# Patient Record
Sex: FEMALE | Race: OTHER | NOT HISPANIC OR LATINO
[De-identification: names, ages, dates, MRNs, and addresses within clinical notes are randomized per-mention and may not be internally consistent; named-entity substitution may affect disease eponyms.]

---

## 2022-09-22 PROBLEM — Z00.00 ENCOUNTER FOR PREVENTIVE HEALTH EXAMINATION: Status: ACTIVE | Noted: 2022-09-22

## 2022-10-11 ENCOUNTER — APPOINTMENT (OUTPATIENT)
Dept: MRI IMAGING | Facility: CLINIC | Age: 32
End: 2022-10-11

## 2022-10-11 ENCOUNTER — OUTPATIENT (OUTPATIENT)
Dept: OUTPATIENT SERVICES | Facility: HOSPITAL | Age: 32
LOS: 1 days | End: 2022-10-11

## 2022-10-11 DIAGNOSIS — Z00.8 ENCOUNTER FOR OTHER GENERAL EXAMINATION: ICD-10-CM

## 2022-10-14 ENCOUNTER — OUTPATIENT (OUTPATIENT)
Dept: OUTPATIENT SERVICES | Facility: HOSPITAL | Age: 32
LOS: 1 days | End: 2022-10-14
Payer: MEDICARE

## 2022-10-14 ENCOUNTER — APPOINTMENT (OUTPATIENT)
Dept: MRI IMAGING | Facility: HOSPITAL | Age: 32
End: 2022-10-14

## 2022-10-14 PROCEDURE — 70553 MRI BRAIN STEM W/O & W/DYE: CPT

## 2022-10-14 PROCEDURE — 70553 MRI BRAIN STEM W/O & W/DYE: CPT | Mod: 26,MH

## 2022-10-14 PROCEDURE — A9585: CPT

## 2022-10-21 ENCOUNTER — NON-APPOINTMENT (OUTPATIENT)
Age: 32
End: 2022-10-21

## 2022-10-21 ENCOUNTER — APPOINTMENT (OUTPATIENT)
Dept: ENDOCRINOLOGY | Facility: CLINIC | Age: 32
End: 2022-10-21

## 2022-10-21 ENCOUNTER — APPOINTMENT (OUTPATIENT)
Dept: OPHTHALMOLOGY | Facility: CLINIC | Age: 32
End: 2022-10-21

## 2022-10-21 VITALS
BODY MASS INDEX: 42.06 KG/M2 | SYSTOLIC BLOOD PRESSURE: 135 MMHG | HEART RATE: 72 BPM | DIASTOLIC BLOOD PRESSURE: 85 MMHG | WEIGHT: 268 LBS | HEIGHT: 67 IN

## 2022-10-21 PROCEDURE — 92004 COMPRE OPH EXAM NEW PT 1/>: CPT

## 2022-10-21 PROCEDURE — 92133 CPTRZD OPH DX IMG PST SGM ON: CPT

## 2022-10-21 PROCEDURE — 99205 OFFICE O/P NEW HI 60 MIN: CPT | Mod: GC

## 2022-10-21 PROCEDURE — 92083 EXTENDED VISUAL FIELD XM: CPT

## 2022-10-25 NOTE — ASSESSMENT
[FreeTextEntry1] : 32 year old with known pituitary macroadenoma\par \par \par \par Pituitary macroadenoma-\par 1cm on MRI with mass effect \par no longer with hyperprolactin symptoms however last level in 300s done in outside lab\par has appt with optho today \par Neurosurgery eval in 1 week- will likely plan surgical intervention (Brad)\par explained to patient risk of possible hormonal abonormalities and need for replacement therapy post operatively. \par Suspicion of non-functioning tumor, hyperprolactinemia likely from mass effect. \par However at this time will do full hormonal workup including but not limited to TSH, IGF-1, prolactin, LH, FSH, DHEAS\par f/u pituitary hormone and adrenal labs \par will collect 24 hour urine for catecholamines\par f/u salivary cortisol \par wrote out for patient order in which to do the workup \par \par will call with results \par plan for close follow up \par \par discussed case with Dr. Barnes

## 2022-10-25 NOTE — PHYSICAL EXAM
[Alert] : alert [Well Nourished] : well nourished [Obese] : obese [No Acute Distress] : no acute distress [Well Developed] : well developed [Normal Sclera/Conjunctiva] : normal sclera/conjunctiva [EOMI] : extra ocular movement intact [No Proptosis] : no proptosis [Normal Oropharynx] : the oropharynx was normal [Thyroid Not Enlarged] : the thyroid was not enlarged [No Thyroid Nodules] : no palpable thyroid nodules [No Respiratory Distress] : no respiratory distress [No Accessory Muscle Use] : no accessory muscle use [Clear to Auscultation] : lungs were clear to auscultation bilaterally [Normal S1, S2] : normal S1 and S2 [Normal Rate] : heart rate was normal [Regular Rhythm] : with a regular rhythm [No Edema] : no peripheral edema [Pedal Pulses Normal] : the pedal pulses are present [Normal Bowel Sounds] : normal bowel sounds [Not Tender] : non-tender [Not Distended] : not distended [Soft] : abdomen soft [Normal Anterior Cervical Nodes] : no anterior cervical lymphadenopathy [Normal Posterior Cervical Nodes] : no posterior cervical lymphadenopathy [No Spinal Tenderness] : no spinal tenderness [Spine Straight] : spine straight [No Stigmata of Cushings Syndrome] : no stigmata of Cushings Syndrome [Normal Gait] : normal gait [Normal Strength/Tone] : muscle strength and tone were normal [No Rash] : no rash [Normal Reflexes] : deep tendon reflexes were 2+ and symmetric [No Tremors] : no tremors [Oriented x3] : oriented to person, place, and time [Acanthosis Nigricans] : no acanthosis nigricans [de-identified] : large gums, poor dentition  [de-identified] : some darkening of the skin noted over interphalangeal joints.

## 2022-10-25 NOTE — HISTORY OF PRESENT ILLNESS
[FreeTextEntry1] : 32 year old with pituitary macroadenoma \par \par Diagnosed with hyperprolactinemia in 2011 when she was having symptoms of galactorrhea and amenorrhea. \par She went for MRI showing pituitary mass, was started on cabergoline with improvement of symptoms. \par She reports no side effects on cabergoline and symptoms improved. She stopped cabergoline in 2016 and her periods remained normal, presenting monthly and galactorrhea did not present again. \par She went for repeat MRI 10/17/22: Findings were compatible with hemorrhagic left pituitary macroadenoma with \par chiasmatic compression and possible extension into the posterior left cavernous sinus.\par \par Prior records are not available for comparison however patient states the mass has never shrunk despite treatment. \par She has an appointment with opthalmologist today and with a neurosurgeon in one week. The plan is for surgical intervention. \par \par She denies additional physical changes such as darkening of the skin, changes in fat distribution, abdominal striae, changes in her gums, denies changes in vision or headaches. \par \par FH: mother had breast cancer, DM\par \par SH: no drinking or smoking \par

## 2022-10-26 LAB
CORTIS SERPL-MCNC: 6 UG/DL
ESTIMATED AVERAGE GLUCOSE: 103 MG/DL
FSH SERPL-MCNC: 1.9 IU/L
GH SERPL-MCNC: 0.07 NG/ML
HBA1C MFR BLD HPLC: 5.2 %
IGF-1 INTERP: NORMAL
IGF-I BLD-MCNC: 243 NG/ML
LH SERPL-ACNC: 1 IU/L
PROLACTIN SERPL-MCNC: 379 NG/ML
TESTOST FREE SERPL-MCNC: 3.1 PG/ML
TESTOST SERPL-MCNC: 18.7 NG/DL
TSH SERPL-ACNC: 1.81 UIU/ML

## 2022-10-29 ENCOUNTER — APPOINTMENT (OUTPATIENT)
Dept: MRI IMAGING | Facility: IMAGING CENTER | Age: 32
End: 2022-10-29

## 2022-10-31 ENCOUNTER — APPOINTMENT (OUTPATIENT)
Dept: ENDOCRINOLOGY | Facility: CLINIC | Age: 32
End: 2022-10-31

## 2022-11-01 ENCOUNTER — APPOINTMENT (OUTPATIENT)
Dept: OTOLARYNGOLOGY | Facility: CLINIC | Age: 32
End: 2022-11-01

## 2022-11-01 VITALS
WEIGHT: 268 LBS | HEIGHT: 67 IN | HEART RATE: 65 BPM | SYSTOLIC BLOOD PRESSURE: 119 MMHG | BODY MASS INDEX: 42.06 KG/M2 | DIASTOLIC BLOOD PRESSURE: 81 MMHG

## 2022-11-01 DIAGNOSIS — Z82.5 FAMILY HISTORY OF ASTHMA AND OTHER CHRONIC LOWER RESPIRATORY DISEASES: ICD-10-CM

## 2022-11-01 DIAGNOSIS — Z80.3 FAMILY HISTORY OF MALIGNANT NEOPLASM OF BREAST: ICD-10-CM

## 2022-11-01 DIAGNOSIS — D35.2 BENIGN NEOPLASM OF PITUITARY GLAND: ICD-10-CM

## 2022-11-01 DIAGNOSIS — Z78.9 OTHER SPECIFIED HEALTH STATUS: ICD-10-CM

## 2022-11-01 DIAGNOSIS — Z83.3 FAMILY HISTORY OF DIABETES MELLITUS: ICD-10-CM

## 2022-11-01 PROCEDURE — 31231 NASAL ENDOSCOPY DX: CPT

## 2022-11-01 PROCEDURE — 99204 OFFICE O/P NEW MOD 45 MIN: CPT | Mod: 25

## 2022-11-01 NOTE — REASON FOR VISIT
[Initial Evaluation] : an initial evaluation for [Family Member] : family member [FreeTextEntry2] : pituitary tumor

## 2022-11-01 NOTE — HISTORY OF PRESENT ILLNESS
[de-identified] : 32 year old female presents for pituitary tumor \par Referred by Dr Roni Ramirez, neurosurgeon\par Has known about pituitary tumor since 2011-- initially treated with meds with some effect but endo thinks hyperprolactinemia is more likely due to mass effect\par Ophtho seen-- note reviewed-- full fields\par \par MRI head 10/14 at [facility]- reviewed personally- Impression: Findings are compatible with hemorrhagic left pituitary macroadenoma with chiasmatic compression and possible extension into the posterior left cavernous sinus\par Reports intermittent nasal congestion, headaches, takes motrin. \par Denies sinus pain/pressure, nasal discharge.

## 2022-11-07 ENCOUNTER — RESULT REVIEW (OUTPATIENT)
Age: 32
End: 2022-11-07

## 2022-11-07 ENCOUNTER — OUTPATIENT (OUTPATIENT)
Dept: OUTPATIENT SERVICES | Facility: HOSPITAL | Age: 32
LOS: 1 days | End: 2022-11-07
Payer: MEDICARE

## 2022-11-07 ENCOUNTER — APPOINTMENT (OUTPATIENT)
Dept: CT IMAGING | Facility: HOSPITAL | Age: 32
End: 2022-11-07

## 2022-11-07 PROCEDURE — 70486 CT MAXILLOFACIAL W/O DYE: CPT | Mod: MH

## 2022-11-07 PROCEDURE — 70486 CT MAXILLOFACIAL W/O DYE: CPT | Mod: 26,MH

## 2022-11-09 LAB
DOPAMINE UR-MCNC: 291 UG/L
DOPAMINE, UR, 24HR: 189 UG/24 HR
EPINEPH UR-MCNC: 4 UG/L
EPINEPHRINE, U, 24HR: 3 UG/24 HR
NOREPINEPH UR-MCNC: 54 UG/L
NOREPINEPHRINE,U,24H: 35 UG/24 HR

## 2022-11-11 LAB — DHEA-SULFATE, SERUM: 209 UG/DL

## 2022-11-21 ENCOUNTER — APPOINTMENT (OUTPATIENT)
Dept: OPHTHALMOLOGY | Facility: CLINIC | Age: 32
End: 2022-11-21

## 2022-12-16 ENCOUNTER — APPOINTMENT (OUTPATIENT)
Dept: ENDOCRINOLOGY | Facility: CLINIC | Age: 32
End: 2022-12-16

## 2023-01-26 ENCOUNTER — OUTPATIENT (OUTPATIENT)
Dept: OUTPATIENT SERVICES | Facility: HOSPITAL | Age: 33
LOS: 1 days | End: 2023-01-26
Payer: MEDICARE

## 2023-01-26 VITALS
OXYGEN SATURATION: 98 % | DIASTOLIC BLOOD PRESSURE: 82 MMHG | SYSTOLIC BLOOD PRESSURE: 128 MMHG | HEIGHT: 67.5 IN | WEIGHT: 274.03 LBS | TEMPERATURE: 99 F | RESPIRATION RATE: 16 BRPM | HEART RATE: 80 BPM

## 2023-01-26 DIAGNOSIS — D35.2 BENIGN NEOPLASM OF PITUITARY GLAND: ICD-10-CM

## 2023-01-26 DIAGNOSIS — D49.7 NEOPLASM OF UNSPECIFIED BEHAVIOR OF ENDOCRINE GLANDS AND OTHER PARTS OF NERVOUS SYSTEM: ICD-10-CM

## 2023-01-26 DIAGNOSIS — E66.01 MORBID (SEVERE) OBESITY DUE TO EXCESS CALORIES: ICD-10-CM

## 2023-01-26 LAB
ANION GAP SERPL CALC-SCNC: 10 MMOL/L — SIGNIFICANT CHANGE UP (ref 7–14)
BLD GP AB SCN SERPL QL: NEGATIVE — SIGNIFICANT CHANGE UP
BUN SERPL-MCNC: 16 MG/DL — SIGNIFICANT CHANGE UP (ref 7–23)
CALCIUM SERPL-MCNC: 9.7 MG/DL — SIGNIFICANT CHANGE UP (ref 8.4–10.5)
CHLORIDE SERPL-SCNC: 106 MMOL/L — SIGNIFICANT CHANGE UP (ref 98–107)
CO2 SERPL-SCNC: 28 MMOL/L — SIGNIFICANT CHANGE UP (ref 22–31)
CREAT SERPL-MCNC: 0.85 MG/DL — SIGNIFICANT CHANGE UP (ref 0.5–1.3)
EGFR: 94 ML/MIN/1.73M2 — SIGNIFICANT CHANGE UP
GLUCOSE SERPL-MCNC: 91 MG/DL — SIGNIFICANT CHANGE UP (ref 70–99)
HCG SERPL-ACNC: <5 MIU/ML — SIGNIFICANT CHANGE UP
HCT VFR BLD CALC: 35.5 % — SIGNIFICANT CHANGE UP (ref 34.5–45)
HGB BLD-MCNC: 11.6 G/DL — SIGNIFICANT CHANGE UP (ref 11.5–15.5)
MCHC RBC-ENTMCNC: 28.9 PG — SIGNIFICANT CHANGE UP (ref 27–34)
MCHC RBC-ENTMCNC: 32.7 GM/DL — SIGNIFICANT CHANGE UP (ref 32–36)
MCV RBC AUTO: 88.3 FL — SIGNIFICANT CHANGE UP (ref 80–100)
NRBC # BLD: 0 /100 WBCS — SIGNIFICANT CHANGE UP (ref 0–0)
NRBC # FLD: 0 K/UL — SIGNIFICANT CHANGE UP (ref 0–0)
PLATELET # BLD AUTO: 257 K/UL — SIGNIFICANT CHANGE UP (ref 150–400)
POTASSIUM SERPL-MCNC: 3.7 MMOL/L — SIGNIFICANT CHANGE UP (ref 3.5–5.3)
POTASSIUM SERPL-SCNC: 3.7 MMOL/L — SIGNIFICANT CHANGE UP (ref 3.5–5.3)
RBC # BLD: 4.02 M/UL — SIGNIFICANT CHANGE UP (ref 3.8–5.2)
RBC # FLD: 13.1 % — SIGNIFICANT CHANGE UP (ref 10.3–14.5)
RH IG SCN BLD-IMP: POSITIVE — SIGNIFICANT CHANGE UP
SODIUM SERPL-SCNC: 144 MMOL/L — SIGNIFICANT CHANGE UP (ref 135–145)
WBC # BLD: 7.73 K/UL — SIGNIFICANT CHANGE UP (ref 3.8–10.5)
WBC # FLD AUTO: 7.73 K/UL — SIGNIFICANT CHANGE UP (ref 3.8–10.5)

## 2023-01-26 PROCEDURE — 93010 ELECTROCARDIOGRAM REPORT: CPT

## 2023-01-26 RX ORDER — SODIUM CHLORIDE 9 MG/ML
1000 INJECTION, SOLUTION INTRAVENOUS
Refills: 0 | Status: DISCONTINUED | OUTPATIENT
Start: 2023-02-03 | End: 2023-02-03

## 2023-01-26 NOTE — H&P PST ADULT - HISTORY OF PRESENT ILLNESS
33 y/o female with hx hyperprolactinemia 2011 with amenorrhea. MRI was done which showed pituitary mass, started on Cabergoline with improvement  Cabergoline was stopped 2016, her periods remained normal.  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  Scheduled for Transphenoidal resection of suprasellar lesion (pituitary tumor, possible mucosal graft, possible nasoseptal flap.    Pt is a poor historian.           .

## 2023-01-26 NOTE — H&P PST ADULT - ASSESSMENT
33 y/o female with hx hyperprolactinemia 2011 with amenorrhea. MRI was done which showed pituitary mass, and was started on Cabergoline with improvement  Cabergoline was stopped 2016, her periods remained normal.  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  Scheduled for Transphenoidal resection of suprasellar lesion (pituitary tumor, possible mucosal graft, possible nasoseptal flap

## 2023-01-26 NOTE — H&P PST ADULT - NSICDXPASTMEDICALHX_GEN_ALL_CORE_FT
PAST MEDICAL HISTORY:  Congenital absence of one kidney     Elevated BP without diagnosis of hypertension     Heart murmur     Obesity, morbid     Pituitary tumor

## 2023-01-26 NOTE — H&P PST ADULT - PROBLEM SELECTOR PLAN 1
Transphenoidal resection of suprasellar lesion (pituitary tumor, possible mucosal graft, possible nasoseptal flap.    CBC BMP HCG T&S EKG    Pt reports she was seen by PMD for preop evaluation as requested by surgeon    Preop instructions and preop covid testing protcol reviewed  with pt    Preop instructions reviewed with pt, her sister Basia, and her father Eddie via zoom with pt's approval

## 2023-01-26 NOTE — H&P PST ADULT - NEUROLOGICAL COMMENTS
hx hyperprolactinemia 2011 with amenorrhea. MRI was done which showed pituitary mass, and was started on Cabergoline with improvement  Cabergoline was stopped 2016, her periods remained normal.  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  Scheduled for Transphenoidal resection of suprasellar lesion (pituitary tumor, possible mucosal graft, possible nasoseptal flap

## 2023-01-26 NOTE — H&P PST ADULT - CARDIOVASCULAR COMMENTS
walks daily 2-4 hours, climbs 1 fl  stairs. Denies CP, no SOB, no palpitations.  METS 5.   Pt reports hx of heart murmur, asymptomatic.  Per pt, last echo was many years ago.  Per pt, she has had elevated BP at times, no meds

## 2023-01-26 NOTE — H&P PST ADULT - NS HP PST ANES REACTION
Fax received from [address] [place] [address],[address] [place] [address].  For trazodone     Next appt 1/12/22    Last seen 12/8/21     Forwarded to DR David Cantu
No

## 2023-02-01 PROBLEM — R01.1 CARDIAC MURMUR, UNSPECIFIED: Chronic | Status: ACTIVE | Noted: 2023-01-26

## 2023-02-01 PROBLEM — E66.01 MORBID (SEVERE) OBESITY DUE TO EXCESS CALORIES: Chronic | Status: ACTIVE | Noted: 2023-01-26

## 2023-02-01 PROBLEM — Q60.0 RENAL AGENESIS, UNILATERAL: Chronic | Status: ACTIVE | Noted: 2023-01-26

## 2023-02-01 PROBLEM — D49.7 NEOPLASM OF UNSPECIFIED BEHAVIOR OF ENDOCRINE GLANDS AND OTHER PARTS OF NERVOUS SYSTEM: Chronic | Status: ACTIVE | Noted: 2023-01-26

## 2023-02-01 PROBLEM — R03.0 ELEVATED BLOOD-PRESSURE READING, WITHOUT DIAGNOSIS OF HYPERTENSION: Chronic | Status: ACTIVE | Noted: 2023-01-26

## 2023-02-02 ENCOUNTER — TRANSCRIPTION ENCOUNTER (OUTPATIENT)
Age: 33
End: 2023-02-02

## 2023-02-02 NOTE — ASU PATIENT PROFILE, ADULT - MUTUALITY COMMENT, PROFILE
-- Message is from the [facility]--    Referral Request  Name of Specialist: Dr. Ghotra  Provider's specialty: Orthopedics    Medical condition for referral:  Broken thumb    Is this a NEW request?:       Referral ordered by: Dr. Jairo Medina       Insurance type: NA      Payor: [employer] - ADVOCATE CELENA / Plan: [employer] ADVANTAGE HMO - ADVOCATE [facility] CELENA / Product Type: CELENA Risk      Preferred Delivery Method   Fax to specialist office. Patient did not have fax to confirm    Caller Information       Type Contact Phone    07/15/2019 08:36 AM Phone (Incoming) Jameel Dumont (Self) [phone] (M)          Alternative phone number: NA    Turnaround time given to caller:   \"This message will be sent to [state Provider's full name]. The clinical team will return your call as soon as they review your message. Typically, it takes 3 business days to process referral requests.\"   n/a

## 2023-02-02 NOTE — ASU PATIENT PROFILE, ADULT - FALL HARM RISK - UNIVERSAL INTERVENTIONS
Bed in lowest position, wheels locked, appropriate side rails in place/Call bell, personal items and telephone in reach/Instruct patient to call for assistance before getting out of bed or chair/Non-slip footwear when patient is out of bed/[place] to call system/Physically safe environment - no spills, clutter or unnecessary equipment/Purposeful Proactive Rounding/Room/bathroom lighting operational, light cord in reach

## 2023-02-03 ENCOUNTER — APPOINTMENT (OUTPATIENT)
Dept: OTOLARYNGOLOGY | Facility: HOSPITAL | Age: 33
End: 2023-02-03

## 2023-02-03 ENCOUNTER — TRANSCRIPTION ENCOUNTER (OUTPATIENT)
Age: 33
End: 2023-02-03

## 2023-02-03 ENCOUNTER — INPATIENT (INPATIENT)
Facility: HOSPITAL | Age: 33
LOS: 1 days | Discharge: HOME | End: 2023-02-05
Attending: NEUROLOGICAL SURGERY | Admitting: NEUROLOGICAL SURGERY
Payer: MEDICAID

## 2023-02-03 VITALS
RESPIRATION RATE: 16 BRPM | TEMPERATURE: 98 F | OXYGEN SATURATION: 99 % | DIASTOLIC BLOOD PRESSURE: 88 MMHG | SYSTOLIC BLOOD PRESSURE: 139 MMHG | WEIGHT: 274.03 LBS | HEIGHT: 67.5 IN | HEART RATE: 63 BPM

## 2023-02-03 DIAGNOSIS — D35.2 BENIGN NEOPLASM OF PITUITARY GLAND: ICD-10-CM

## 2023-02-03 LAB
ANION GAP SERPL CALC-SCNC: 11 MMOL/L — SIGNIFICANT CHANGE UP (ref 7–14)
ANION GAP SERPL CALC-SCNC: 7 MMOL/L — SIGNIFICANT CHANGE UP (ref 7–14)
BUN SERPL-MCNC: 9 MG/DL — SIGNIFICANT CHANGE UP (ref 7–23)
BUN SERPL-MCNC: 9 MG/DL — SIGNIFICANT CHANGE UP (ref 7–23)
CALCIUM SERPL-MCNC: 9.1 MG/DL — SIGNIFICANT CHANGE UP (ref 8.4–10.5)
CALCIUM SERPL-MCNC: 9.2 MG/DL — SIGNIFICANT CHANGE UP (ref 8.4–10.5)
CHLORIDE SERPL-SCNC: 106 MMOL/L — SIGNIFICANT CHANGE UP (ref 98–107)
CHLORIDE SERPL-SCNC: 108 MMOL/L — HIGH (ref 98–107)
CO2 SERPL-SCNC: 24 MMOL/L — SIGNIFICANT CHANGE UP (ref 22–31)
CO2 SERPL-SCNC: 25 MMOL/L — SIGNIFICANT CHANGE UP (ref 22–31)
CREAT SERPL-MCNC: 0.85 MG/DL — SIGNIFICANT CHANGE UP (ref 0.5–1.3)
CREAT SERPL-MCNC: 0.93 MG/DL — SIGNIFICANT CHANGE UP (ref 0.5–1.3)
EGFR: 84 ML/MIN/1.73M2 — SIGNIFICANT CHANGE UP
EGFR: 94 ML/MIN/1.73M2 — SIGNIFICANT CHANGE UP
GAS PNL BLDA: SIGNIFICANT CHANGE UP
GAS PNL BLDA: SIGNIFICANT CHANGE UP
GLUCOSE SERPL-MCNC: 114 MG/DL — HIGH (ref 70–99)
GLUCOSE SERPL-MCNC: 175 MG/DL — HIGH (ref 70–99)
HCG UR QL: NEGATIVE — SIGNIFICANT CHANGE UP
HCT VFR BLD CALC: 37.5 % — SIGNIFICANT CHANGE UP (ref 34.5–45)
HCT VFR BLD CALC: 37.7 % — SIGNIFICANT CHANGE UP (ref 34.5–45)
HGB BLD-MCNC: 12.2 G/DL — SIGNIFICANT CHANGE UP (ref 11.5–15.5)
HGB BLD-MCNC: 12.4 G/DL — SIGNIFICANT CHANGE UP (ref 11.5–15.5)
MAGNESIUM SERPL-MCNC: 1.8 MG/DL — SIGNIFICANT CHANGE UP (ref 1.6–2.6)
MCHC RBC-ENTMCNC: 28.3 PG — SIGNIFICANT CHANGE UP (ref 27–34)
MCHC RBC-ENTMCNC: 28.5 PG — SIGNIFICANT CHANGE UP (ref 27–34)
MCHC RBC-ENTMCNC: 32.5 GM/DL — SIGNIFICANT CHANGE UP (ref 32–36)
MCHC RBC-ENTMCNC: 32.9 GM/DL — SIGNIFICANT CHANGE UP (ref 32–36)
MCV RBC AUTO: 86.7 FL — SIGNIFICANT CHANGE UP (ref 80–100)
MCV RBC AUTO: 87 FL — SIGNIFICANT CHANGE UP (ref 80–100)
NRBC # BLD: 0 /100 WBCS — SIGNIFICANT CHANGE UP (ref 0–0)
NRBC # BLD: 0 /100 WBCS — SIGNIFICANT CHANGE UP (ref 0–0)
NRBC # FLD: 0 K/UL — SIGNIFICANT CHANGE UP (ref 0–0)
NRBC # FLD: 0 K/UL — SIGNIFICANT CHANGE UP (ref 0–0)
PHOSPHATE SERPL-MCNC: 2.6 MG/DL — SIGNIFICANT CHANGE UP (ref 2.5–4.5)
PLATELET # BLD AUTO: 319 K/UL — SIGNIFICANT CHANGE UP (ref 150–400)
PLATELET # BLD AUTO: 322 K/UL — SIGNIFICANT CHANGE UP (ref 150–400)
POTASSIUM SERPL-MCNC: 3.8 MMOL/L — SIGNIFICANT CHANGE UP (ref 3.5–5.3)
POTASSIUM SERPL-MCNC: 3.9 MMOL/L — SIGNIFICANT CHANGE UP (ref 3.5–5.3)
POTASSIUM SERPL-SCNC: 3.8 MMOL/L — SIGNIFICANT CHANGE UP (ref 3.5–5.3)
POTASSIUM SERPL-SCNC: 3.9 MMOL/L — SIGNIFICANT CHANGE UP (ref 3.5–5.3)
RBC # BLD: 4.31 M/UL — SIGNIFICANT CHANGE UP (ref 3.8–5.2)
RBC # BLD: 4.35 M/UL — SIGNIFICANT CHANGE UP (ref 3.8–5.2)
RBC # FLD: 13.1 % — SIGNIFICANT CHANGE UP (ref 10.3–14.5)
RBC # FLD: 13.1 % — SIGNIFICANT CHANGE UP (ref 10.3–14.5)
RH IG SCN BLD-IMP: POSITIVE — SIGNIFICANT CHANGE UP
SODIUM SERPL-SCNC: 139 MMOL/L — SIGNIFICANT CHANGE UP (ref 135–145)
SODIUM SERPL-SCNC: 140 MMOL/L — SIGNIFICANT CHANGE UP (ref 135–145)
SODIUM SERPL-SCNC: 141 MMOL/L — SIGNIFICANT CHANGE UP (ref 135–145)
WBC # BLD: 11.72 K/UL — HIGH (ref 3.8–10.5)
WBC # BLD: 12.66 K/UL — HIGH (ref 3.8–10.5)
WBC # FLD AUTO: 11.72 K/UL — HIGH (ref 3.8–10.5)
WBC # FLD AUTO: 12.66 K/UL — HIGH (ref 3.8–10.5)

## 2023-02-03 PROCEDURE — 88342 IMHCHEM/IMCYTCHM 1ST ANTB: CPT | Mod: 26,59

## 2023-02-03 PROCEDURE — 99222 1ST HOSP IP/OBS MODERATE 55: CPT | Mod: GC

## 2023-02-03 PROCEDURE — 62165 REMOVE PITUIT TUMOR W/SCOPE: CPT | Mod: 62

## 2023-02-03 PROCEDURE — 70450 CT HEAD/BRAIN W/O DYE: CPT | Mod: 26

## 2023-02-03 PROCEDURE — 88360 TUMOR IMMUNOHISTOCHEM/MANUAL: CPT | Mod: 26

## 2023-02-03 PROCEDURE — 61782 SCAN PROC CRANIAL EXTRA: CPT

## 2023-02-03 PROCEDURE — 77011 CT SCAN FOR LOCALIZATION: CPT | Mod: 26

## 2023-02-03 PROCEDURE — 88341 IMHCHEM/IMCYTCHM EA ADD ANTB: CPT | Mod: 26,59

## 2023-02-03 PROCEDURE — 88305 TISSUE EXAM BY PATHOLOGIST: CPT | Mod: 26

## 2023-02-03 DEVICE — MAYFIELD SKULL PIN ADULT PLASTIC: Type: IMPLANTABLE DEVICE | Status: FUNCTIONAL

## 2023-02-03 DEVICE — CATH EDM LUMBAR CLOSED TIP BARIUM IMPREGNATED 80CM: Type: IMPLANTABLE DEVICE | Status: FUNCTIONAL

## 2023-02-03 DEVICE — SURGICEL 2 X 14": Type: IMPLANTABLE DEVICE | Status: FUNCTIONAL

## 2023-02-03 DEVICE — DURASEAL: Type: IMPLANTABLE DEVICE | Status: FUNCTIONAL

## 2023-02-03 DEVICE — SURGIFOAM PAD 8CM X 12.5CM X 2MM (100C): Type: IMPLANTABLE DEVICE | Status: FUNCTIONAL

## 2023-02-03 DEVICE — DURAGEN PLUS MATRIX 1 X 3": Type: IMPLANTABLE DEVICE | Status: FUNCTIONAL

## 2023-02-03 DEVICE — SURGIFLO HEMOSTATIC MATRIX KIT: Type: IMPLANTABLE DEVICE | Status: FUNCTIONAL

## 2023-02-03 RX ORDER — MAGNESIUM SULFATE 500 MG/ML
1 VIAL (ML) INJECTION ONCE
Refills: 0 | Status: COMPLETED | OUTPATIENT
Start: 2023-02-03 | End: 2023-02-03

## 2023-02-03 RX ORDER — POTASSIUM PHOSPHATE, MONOBASIC POTASSIUM PHOSPHATE, DIBASIC 236; 224 MG/ML; MG/ML
15 INJECTION, SOLUTION INTRAVENOUS ONCE
Refills: 0 | Status: COMPLETED | OUTPATIENT
Start: 2023-02-03 | End: 2023-02-03

## 2023-02-03 RX ORDER — ACETAMINOPHEN 500 MG
1000 TABLET ORAL EVERY 6 HOURS
Refills: 0 | Status: DISCONTINUED | OUTPATIENT
Start: 2023-02-03 | End: 2023-02-03

## 2023-02-03 RX ORDER — OXYCODONE HYDROCHLORIDE 5 MG/1
5 TABLET ORAL EVERY 6 HOURS
Refills: 0 | Status: DISCONTINUED | OUTPATIENT
Start: 2023-02-03 | End: 2023-02-05

## 2023-02-03 RX ORDER — SENNA PLUS 8.6 MG/1
2 TABLET ORAL AT BEDTIME
Refills: 0 | Status: DISCONTINUED | OUTPATIENT
Start: 2023-02-03 | End: 2023-02-05

## 2023-02-03 RX ORDER — ACETAMINOPHEN 500 MG
975 TABLET ORAL EVERY 6 HOURS
Refills: 0 | Status: DISCONTINUED | OUTPATIENT
Start: 2023-02-03 | End: 2023-02-05

## 2023-02-03 RX ORDER — POLYETHYLENE GLYCOL 3350 17 G/17G
17 POWDER, FOR SOLUTION ORAL DAILY
Refills: 0 | Status: DISCONTINUED | OUTPATIENT
Start: 2023-02-03 | End: 2023-02-05

## 2023-02-03 RX ORDER — MAGNESIUM SULFATE 500 MG/ML
2 VIAL (ML) INJECTION ONCE
Refills: 0 | Status: COMPLETED | OUTPATIENT
Start: 2023-02-03 | End: 2023-02-03

## 2023-02-03 RX ORDER — CEFAZOLIN SODIUM 1 G
2000 VIAL (EA) INJECTION EVERY 8 HOURS
Refills: 0 | Status: COMPLETED | OUTPATIENT
Start: 2023-02-03 | End: 2023-02-04

## 2023-02-03 RX ORDER — FENTANYL CITRATE 50 UG/ML
50 INJECTION INTRAVENOUS
Refills: 0 | Status: DISCONTINUED | OUTPATIENT
Start: 2023-02-03 | End: 2023-02-03

## 2023-02-03 RX ORDER — SODIUM,POTASSIUM PHOSPHATES 278-250MG
1 POWDER IN PACKET (EA) ORAL ONCE
Refills: 0 | Status: COMPLETED | OUTPATIENT
Start: 2023-02-03 | End: 2023-02-03

## 2023-02-03 RX ORDER — ONDANSETRON 8 MG/1
4 TABLET, FILM COATED ORAL ONCE
Refills: 0 | Status: DISCONTINUED | OUTPATIENT
Start: 2023-02-03 | End: 2023-02-04

## 2023-02-03 RX ORDER — FENTANYL CITRATE 50 UG/ML
25 INJECTION INTRAVENOUS
Refills: 0 | Status: DISCONTINUED | OUTPATIENT
Start: 2023-02-03 | End: 2023-02-03

## 2023-02-03 RX ORDER — INFLUENZA VIRUS VACCINE 15; 15; 15; 15 UG/.5ML; UG/.5ML; UG/.5ML; UG/.5ML
0.5 SUSPENSION INTRAMUSCULAR ONCE
Refills: 0 | Status: DISCONTINUED | OUTPATIENT
Start: 2023-02-03 | End: 2023-02-05

## 2023-02-03 RX ADMIN — Medication 100 GRAM(S): at 20:00

## 2023-02-03 RX ADMIN — POTASSIUM PHOSPHATE, MONOBASIC POTASSIUM PHOSPHATE, DIBASIC 62.5 MILLIMOLE(S): 236; 224 INJECTION, SOLUTION INTRAVENOUS at 23:27

## 2023-02-03 RX ADMIN — Medication 25 GRAM(S): at 23:27

## 2023-02-03 RX ADMIN — Medication 975 MILLIGRAM(S): at 18:20

## 2023-02-03 RX ADMIN — Medication 100 MILLIGRAM(S): at 18:19

## 2023-02-03 RX ADMIN — Medication 1 PACKET(S): at 20:46

## 2023-02-03 NOTE — BRIEF OPERATIVE NOTE - NSICDXBRIEFPROCEDURE_GEN_ALL_CORE_FT
PROCEDURES:  Endoscopic transphenoidal or transnasal resection of pituitary tumor 03-Feb-2023 12:44:05  Zonia Tolbert

## 2023-02-03 NOTE — CONSULT NOTE ADULT - SUBJECTIVE AND OBJECTIVE BOX
SICU Consultation Note  =====================================================  HPI:  31 y/o female with hx hyperprolactinemia 2011 with amenorrhea. MRI was done which showed pituitary mass, started on Cabergoline with improvement  Cabergoline was stopped 2016, her periods remained normal.  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  Scheduled for Transphenoidal resection of suprasellar lesion (pituitary tumor, possible mucosal graft, possible nasoseptal flap.      (26 Jan 2023 18:37)      Surgery Information  OR time:      EBL:          IV Fluids:       Blood Products:   UOP:          PAST MEDICAL & SURGICAL HISTORY:  Pituitary tumor      Congenital absence of one kidney      Heart murmur      Elevated BP without diagnosis of hypertension      Obesity, morbid      No significant past surgical history        Home Meds: Home Medications:  no home medication:  (26 Jan 2023 18:52)    Allergies: Allergies    No Known Allergies    Intolerances      Soc:   Advanced Directives: Presumed Full Code     ROS:    REVIEW OF SYSTEMS    [ ] A ten-point review of systems was otherwise negative except as noted.  [ ] Due to altered mental status/intubation, subjective information were not able to be obtained from the patient. History was obtained, to the extent possible, from review of the chart and collateral sources of information.      CURRENT MEDICATIONS:   --------------------------------------------------------------------------------------  Neurologic Medications  acetaminophen     Tablet .. 1000 milliGRAM(s) Oral every 6 hours PRN Temp greater or equal to 38C (100.4F), Mild Pain (1 - 3)  fentaNYL    Injectable 25 MICROGram(s) IV Push every 5 minutes PRN Moderate Pain (4 - 6)  fentaNYL    Injectable 50 MICROGram(s) IV Push every 15 minutes PRN Severe Pain (7 - 10)  ondansetron Injectable 4 milliGRAM(s) IV Push once PRN Nausea and/or Vomiting  oxyCODONE    IR 5 milliGRAM(s) Oral every 6 hours PRN Moderate Pain (4 - 6)    Respiratory Medications    Cardiovascular Medications    Gastrointestinal Medications  lactated ringers. 1000 milliLiter(s) IV Continuous <Continuous>  polyethylene glycol 3350 17 Gram(s) Oral daily  senna 2 Tablet(s) Oral at bedtime    Genitourinary Medications    Hematologic/Oncologic Medications    Antimicrobial/Immunologic Medications  ceFAZolin   IVPB 2000 milliGRAM(s) IV Intermittent every 8 hours    Endocrine/Metabolic Medications    Topical/Other Medications    --------------------------------------------------------------------------------------    VITAL SIGNS, INS/OUTS (last 24 hours):  --------------------------------------------------------------------------------------  ICU Vital Signs Last 24 Hrs  T(C): 36.2 (03 Feb 2023 14:00), Max: 36.8 (03 Feb 2023 06:56)  T(F): 97.2 (03 Feb 2023 14:00), Max: 98.2 (03 Feb 2023 06:56)  HR: 83 (03 Feb 2023 14:15) (63 - 90)  BP: 140/86 (03 Feb 2023 14:15) (128/67 - 144/91)  BP(mean): 100 (03 Feb 2023 14:15) (91 - 106)  ABP: 142/71 (03 Feb 2023 14:15) (136/75 - 150/80)  ABP(mean): 97 (03 Feb 2023 14:15) (95 - 106)  RR: 19 (03 Feb 2023 14:15) (15 - 19)  SpO2: 98% (03 Feb 2023 14:15) (98% - 100%)    O2 Parameters below as of 03 Feb 2023 14:15  Patient On (Oxygen Delivery Method): nasal cannula  O2 Flow (L/min): 2        I&O's Summary    03 Feb 2023 07:01  -  03 Feb 2023 15:27  --------------------------------------------------------  IN: 30 mL / OUT: 110 mL / NET: -80 mL      --------------------------------------------------------------------------------------    EXAM:  General/Neuro  RASS:   GCS:   Exam: Normal, NAD, alert, oriented x 3, no focal deficits. PERRLA  ***    Respiratory  Exam: Lungs clear to auscultation, Normal expansion/effort.  ***  [] Tracheostomy   [] Intubated  Mechanical Ventilation:     Cardiovascular  Exam: S1, S2.  Regular rate and rhythm.  Peripheral edema  ***  Cardiac Rhythm: Normal Sinus Rhythm  ECHO:     GI  Exam: Abdomen soft, Non-tender, Non-distended.  Gastrostomy / Jejunostomy tube in place.  Nasogastric tube in place.  Colostomy / Ileostomy.  ***  Wound:   ***  Current Diet:  NPO***      Tubes/Lines/Drains  ***  [x] Peripheral IV  [] Central Venous Line     	[] R	[] L	[] IJ	[] Fem	[] SC        Type:	    Date Placed:   [] Arterial Line		[] R	[] L	[] Fem	[] Rad	[] Ax	Date Placed:   [] PICC:         	[] Midline		[] Mediport           [] Urinary Catheter		Date Placed:     Extremities  Exam: Extremities warm, pink, well-perfused.        Derm:  Exam: Good skin turgor, no skin breakdown.      :   Exam: Fernandez catheter in place.     LABS  --------------------------------------------------------------------------------------  Labs:  CAPILLARY BLOOD GLUCOSE                              12.2   11.72 )-----------( 319      ( 03 Feb 2023 14:35 )             37.5         02-03    140  |  108<H>  |  9   ----------------------------<  114<H>  3.8   |  25  |  0.85      Calcium, Total Serum: 9.2 mg/dL (02-03-23 @ 14:35)      LFTs:     Blood Gas Arterial, Lactate: 0.7 mmol/L (02-03-23 @ 11:42)  Blood Gas Arterial, Lactate: 0.8 mmol/L (02-03-23 @ 09:35)    ABG - ( 03 Feb 2023 11:42 )  pH: 7.44  /  pCO2: 36    /  pO2: 176   / HCO3: 24    / Base Excess: 0.6   /  SaO2: 99.3            ABG - ( 03 Feb 2023 09:35 )  pH: 7.45  /  pCO2: 34    /  pO2: 268   / HCO3: 24    / Base Excess: 0.0   /  SaO2: 99.6              Coags:                  --------------------------------------------------------------------------------------    OTHER LABS    IMAGING RESULTS      ASSESSMENT:  32y Female hx hyperprolactinemia 2011 with amenorrhea. MRI revealed pituitary mass, now POD#0 transphenoidal resection of hemorrhagic pituitary adenoma.         PLAN  NEUROLOGIC   - AAOx 3  - Pain control with Tylenol and Dilaudid     RESPIRATORY   - Currently on RA   - Monitor SpO2 goal >92%    CARDIOVASCULAR   - Monitor hemodynamics   - MAP goal > 65    GASTROINTESTINAL   - Diet: regular diet     /RENAL   - IV fluids: LR @ 30 cc/hr   - Monitor BMP  - Strict I/Os  - Monitor electrolytes, replete PRN  - Maintain fernandez catheter  - DI watch f/u BMP q6     HEMATOLOGIC  - Monitor H/H   - DVT prophylaxis: SCDs for now     INFECTIOUS DISEASE  - Monitor fever/WC    ENDOCRINE  - Monitor gluc  - ISS    LINES  - rad a line  - PIV  - Fernandez    Disposition:   SICU  --------------------------------------------------------------------------------------    Critical Care Diagnoses:     SICU Consultation Note  =====================================================  HPI:  31 y/o female with hx hyperprolactinemia 2011 with amenorrhea. MRI was done which showed pituitary mass, started on Cabergoline with improvement  Cabergoline was stopped 2016, her periods remained normal.  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  Scheduled for Transphenoidal resection of suprasellar lesion (pituitary tumor, possible mucosal graft, possible nasoseptal flap. Fat pad was harvested and used to close dura. Lumbar drain removed at end of case, fluoroscein given.      SICU is consulted for Q1 NV checks.      (26 Jan 2023 18:37)      Surgery Information  OR time:      EBL:          IV Fluids:       Blood Products:   UOP:          PAST MEDICAL & SURGICAL HISTORY:  Pituitary tumor      Congenital absence of one kidney      Heart murmur      Elevated BP without diagnosis of hypertension      Obesity, morbid      No significant past surgical history        Home Meds: Home Medications:  no home medication:  (26 Jan 2023 18:52)    Allergies: Allergies    No Known Allergies    Intolerances      Soc:   Advanced Directives: Presumed Full Code     ROS:    REVIEW OF SYSTEMS    [ ] A ten-point review of systems was otherwise negative except as noted.  [ ] Due to altered mental status/intubation, subjective information were not able to be obtained from the patient. History was obtained, to the extent possible, from review of the chart and collateral sources of information.      CURRENT MEDICATIONS:   --------------------------------------------------------------------------------------  Neurologic Medications  acetaminophen     Tablet .. 1000 milliGRAM(s) Oral every 6 hours PRN Temp greater or equal to 38C (100.4F), Mild Pain (1 - 3)  fentaNYL    Injectable 25 MICROGram(s) IV Push every 5 minutes PRN Moderate Pain (4 - 6)  fentaNYL    Injectable 50 MICROGram(s) IV Push every 15 minutes PRN Severe Pain (7 - 10)  ondansetron Injectable 4 milliGRAM(s) IV Push once PRN Nausea and/or Vomiting  oxyCODONE    IR 5 milliGRAM(s) Oral every 6 hours PRN Moderate Pain (4 - 6)    Respiratory Medications    Cardiovascular Medications    Gastrointestinal Medications  lactated ringers. 1000 milliLiter(s) IV Continuous <Continuous>  polyethylene glycol 3350 17 Gram(s) Oral daily  senna 2 Tablet(s) Oral at bedtime    Genitourinary Medications    Hematologic/Oncologic Medications    Antimicrobial/Immunologic Medications  ceFAZolin   IVPB 2000 milliGRAM(s) IV Intermittent every 8 hours    Endocrine/Metabolic Medications    Topical/Other Medications    --------------------------------------------------------------------------------------    VITAL SIGNS, INS/OUTS (last 24 hours):  --------------------------------------------------------------------------------------  ICU Vital Signs Last 24 Hrs  T(C): 36.2 (03 Feb 2023 14:00), Max: 36.8 (03 Feb 2023 06:56)  T(F): 97.2 (03 Feb 2023 14:00), Max: 98.2 (03 Feb 2023 06:56)  HR: 83 (03 Feb 2023 14:15) (63 - 90)  BP: 140/86 (03 Feb 2023 14:15) (128/67 - 144/91)  BP(mean): 100 (03 Feb 2023 14:15) (91 - 106)  ABP: 142/71 (03 Feb 2023 14:15) (136/75 - 150/80)  ABP(mean): 97 (03 Feb 2023 14:15) (95 - 106)  RR: 19 (03 Feb 2023 14:15) (15 - 19)  SpO2: 98% (03 Feb 2023 14:15) (98% - 100%)    O2 Parameters below as of 03 Feb 2023 14:15  Patient On (Oxygen Delivery Method): nasal cannula  O2 Flow (L/min): 2        I&O's Summary    03 Feb 2023 07:01  -  03 Feb 2023 15:27  --------------------------------------------------------  IN: 30 mL / OUT: 110 mL / NET: -80 mL      --------------------------------------------------------------------------------------    EXAM:  General/Neuro  RASS:   GCS:   Exam: Normal, NAD, alert, oriented x 3, no focal deficits. PERRLA  ***    Respiratory  Exam: Lungs clear to auscultation, Normal expansion/effort.  ***  [] Tracheostomy   [] Intubated  Mechanical Ventilation:     Cardiovascular  Exam: S1, S2.  Regular rate and rhythm.  Peripheral edema  ***  Cardiac Rhythm: Normal Sinus Rhythm  ECHO:     GI  Exam: Abdomen soft, Non-tender, Non-distended.  Gastrostomy / Jejunostomy tube in place.  Nasogastric tube in place.  Colostomy / Ileostomy.  ***  Wound:   ***  Current Diet:  NPO***      Tubes/Lines/Drains  ***  [x] Peripheral IV  [] Central Venous Line     	[] R	[] L	[] IJ	[] Fem	[] SC        Type:	    Date Placed:   [] Arterial Line		[] R	[] L	[] Fem	[] Rad	[] Ax	Date Placed:   [] PICC:         	[] Midline		[] Mediport           [] Urinary Catheter		Date Placed:     Extremities  Exam: Extremities warm, pink, well-perfused.        Derm:  Exam: Good skin turgor, no skin breakdown.      :   Exam: Fernandez catheter in place.     LABS  --------------------------------------------------------------------------------------  Labs:  CAPILLARY BLOOD GLUCOSE                              12.2   11.72 )-----------( 319      ( 03 Feb 2023 14:35 )             37.5         02-03    140  |  108<H>  |  9   ----------------------------<  114<H>  3.8   |  25  |  0.85      Calcium, Total Serum: 9.2 mg/dL (02-03-23 @ 14:35)      LFTs:     Blood Gas Arterial, Lactate: 0.7 mmol/L (02-03-23 @ 11:42)  Blood Gas Arterial, Lactate: 0.8 mmol/L (02-03-23 @ 09:35)    ABG - ( 03 Feb 2023 11:42 )  pH: 7.44  /  pCO2: 36    /  pO2: 176   / HCO3: 24    / Base Excess: 0.6   /  SaO2: 99.3            ABG - ( 03 Feb 2023 09:35 )  pH: 7.45  /  pCO2: 34    /  pO2: 268   / HCO3: 24    / Base Excess: 0.0   /  SaO2: 99.6              Coags:                  --------------------------------------------------------------------------------------    OTHER LABS    IMAGING RESULTS      ASSESSMENT:  32y Female hx hyperprolactinemia 2011 with amenorrhea. MRI revealed pituitary mass, now POD#0 transphenoidal resection of hemorrhagic pituitary adenoma.         PLAN  NEUROLOGIC   - AAOx 3  - Pain control with Tylenol and Dilaudid   - CTH done post op, f/u read  - Monitor mustache dressing for leak  - Q1NV checks    RESPIRATORY   - Currently on RA   - Monitor SpO2 goal >92%  - Sinus precautions, no blowing or instrumentation of nose      CARDIOVASCULAR   - Monitor hemodynamics   - No MAP goals per primary team    GASTROINTESTINAL   - Diet: regular diet   - Bowl regimen to prevent straining    /RENAL   - IV fluids: LR @ 30 cc/hr   - Monitor BMP  - Strict I/Os  - Monitor electrolytes, replete PRN  - Maintain fernandez catheter  - DI watch f/u BMP q6     HEMATOLOGIC  - Monitor H/H   - DVT prophylaxis: SCDs for now  - Hold DVT ppx until 2/4 per primary team     INFECTIOUS DISEASE  - Monitor fever/WC    ENDOCRINE  - Monitor gluc  - ISS    LINES  - rad a line  - PIV  - Fernandez    Disposition:   SICU, under PACU care  --------------------------------------------------------------------------------------    Critical Care Diagnoses:     SICU Consultation Note  =====================================================  HPI:  31 y/o female with hx hyperprolactinemia 2011 with amenorrhea. MRI was done which showed pituitary mass, started on Cabergoline with improvement  Cabergoline was stopped 2016, her periods remained normal.  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  Scheduled for Transphenoidal resection of suprasellar lesion (pituitary tumor, possible mucosal graft, possible nasoseptal flap. Fat pad was harvested and used to close dura. Lumbar drain removed at end of case, fluoroscein given.      SICU is consulted for Q1 NV checks.      (26 Jan 2023 18:37)      Surgery Information  OR time:      EBL:          IV Fluids:       Blood Products:   UOP:          PAST MEDICAL & SURGICAL HISTORY:  Pituitary tumor      Congenital absence of one kidney      Heart murmur      Elevated BP without diagnosis of hypertension      Obesity, morbid      No significant past surgical history        Home Meds: Home Medications:  no home medication:  (26 Jan 2023 18:52)    Allergies: Allergies    No Known Allergies    Intolerances      Soc:   Advanced Directives: Presumed Full Code     ROS:    REVIEW OF SYSTEMS    [ ] A ten-point review of systems was otherwise negative except as noted.  [ ] Due to altered mental status/intubation, subjective information were not able to be obtained from the patient. History was obtained, to the extent possible, from review of the chart and collateral sources of information.      CURRENT MEDICATIONS:   --------------------------------------------------------------------------------------  Neurologic Medications  acetaminophen     Tablet .. 1000 milliGRAM(s) Oral every 6 hours PRN Temp greater or equal to 38C (100.4F), Mild Pain (1 - 3)  fentaNYL    Injectable 25 MICROGram(s) IV Push every 5 minutes PRN Moderate Pain (4 - 6)  fentaNYL    Injectable 50 MICROGram(s) IV Push every 15 minutes PRN Severe Pain (7 - 10)  ondansetron Injectable 4 milliGRAM(s) IV Push once PRN Nausea and/or Vomiting  oxyCODONE    IR 5 milliGRAM(s) Oral every 6 hours PRN Moderate Pain (4 - 6)    Respiratory Medications    Cardiovascular Medications    Gastrointestinal Medications  lactated ringers. 1000 milliLiter(s) IV Continuous <Continuous>  polyethylene glycol 3350 17 Gram(s) Oral daily  senna 2 Tablet(s) Oral at bedtime    Genitourinary Medications    Hematologic/Oncologic Medications    Antimicrobial/Immunologic Medications  ceFAZolin   IVPB 2000 milliGRAM(s) IV Intermittent every 8 hours    Endocrine/Metabolic Medications    Topical/Other Medications    --------------------------------------------------------------------------------------    VITAL SIGNS, INS/OUTS (last 24 hours):  --------------------------------------------------------------------------------------  ICU Vital Signs Last 24 Hrs  T(C): 36.2 (03 Feb 2023 14:00), Max: 36.8 (03 Feb 2023 06:56)  T(F): 97.2 (03 Feb 2023 14:00), Max: 98.2 (03 Feb 2023 06:56)  HR: 83 (03 Feb 2023 14:15) (63 - 90)  BP: 140/86 (03 Feb 2023 14:15) (128/67 - 144/91)  BP(mean): 100 (03 Feb 2023 14:15) (91 - 106)  ABP: 142/71 (03 Feb 2023 14:15) (136/75 - 150/80)  ABP(mean): 97 (03 Feb 2023 14:15) (95 - 106)  RR: 19 (03 Feb 2023 14:15) (15 - 19)  SpO2: 98% (03 Feb 2023 14:15) (98% - 100%)    O2 Parameters below as of 03 Feb 2023 14:15  Patient On (Oxygen Delivery Method): nasal cannula  O2 Flow (L/min): 2        I&O's Summary    03 Feb 2023 07:01  -  03 Feb 2023 15:27  --------------------------------------------------------  IN: 30 mL / OUT: 110 mL / NET: -80 mL      --------------------------------------------------------------------------------------    EXAM:  General/Neuro  RASS:   GCS:   Exam: Normal, NAD, alert, oriented x 3, no focal deficits. PERRLA  ***    Respiratory  Exam: Lungs clear to auscultation, Normal expansion/effort.  ***  [] Tracheostomy   [] Intubated  Mechanical Ventilation:     Cardiovascular  Exam: S1, S2.  Regular rate and rhythm.  Peripheral edema  ***  Cardiac Rhythm: Normal Sinus Rhythm  ECHO:     GI  Exam: Abdomen soft, Non-tender, Non-distended.  Gastrostomy / Jejunostomy tube in place.  Nasogastric tube in place.  Colostomy / Ileostomy.  ***  Wound:   ***  Current Diet:  NPO***      Tubes/Lines/Drains  ***  [x] Peripheral IV  [] Central Venous Line     	[] R	[] L	[] IJ	[] Fem	[] SC        Type:	    Date Placed:   [] Arterial Line		[] R	[] L	[] Fem	[] Rad	[] Ax	Date Placed:   [] PICC:         	[] Midline		[] Mediport           [] Urinary Catheter		Date Placed:     Extremities  Exam: Extremities warm, pink, well-perfused.        Derm:  Exam: Good skin turgor, no skin breakdown.      :   Exam: Fernandez catheter in place.     LABS  --------------------------------------------------------------------------------------  Labs:  CAPILLARY BLOOD GLUCOSE                              12.2   11.72 )-----------( 319      ( 03 Feb 2023 14:35 )             37.5         02-03    140  |  108<H>  |  9   ----------------------------<  114<H>  3.8   |  25  |  0.85      Calcium, Total Serum: 9.2 mg/dL (02-03-23 @ 14:35)      LFTs:     Blood Gas Arterial, Lactate: 0.7 mmol/L (02-03-23 @ 11:42)  Blood Gas Arterial, Lactate: 0.8 mmol/L (02-03-23 @ 09:35)    ABG - ( 03 Feb 2023 11:42 )  pH: 7.44  /  pCO2: 36    /  pO2: 176   / HCO3: 24    / Base Excess: 0.6   /  SaO2: 99.3            ABG - ( 03 Feb 2023 09:35 )  pH: 7.45  /  pCO2: 34    /  pO2: 268   / HCO3: 24    / Base Excess: 0.0   /  SaO2: 99.6              Coags:                  --------------------------------------------------------------------------------------    OTHER LABS    IMAGING RESULTS      ASSESSMENT:  32y Female hx hyperprolactinemia 2011 with amenorrhea. MRI revealed pituitary mass, now POD#0 transphenoidal resection of hemorrhagic pituitary adenoma.         PLAN  NEUROLOGIC   - AAOx 3  - Pain control with Tylenol and Dilaudid   - CTH done post op, f/u read  - Monitor mustache dressing for leak  - Q1NV checks    RESPIRATORY   - Currently on RA   - Monitor SpO2 goal >92%  - Sinus precautions, no blowing or instrumentation of nose      CARDIOVASCULAR   - Monitor hemodynamics   - No MAP goals per primary team    GASTROINTESTINAL   - Diet: regular diet   - Bowl regimen to prevent straining    /RENAL   - IV fluids: LR @ 30 cc/hr   - Monitor BMP  - Strict I/Os  - Monitor electrolytes, replete PRN  - Maintain fernandez catheter  - DI watch f/u BMP q6     HEMATOLOGIC  - Monitor H/H   - DVT prophylaxis: SCDs for now  - Hold DVT ppx until 2/4 per primary team     INFECTIOUS DISEASE  - Monitor fever/WC  - Ancef x 24 hr    ENDOCRINE  - Monitor gluc  - ISS    LINES  - rad a line  - PIV  - Fernandez    Disposition:   SICU, under PACU care  --------------------------------------------------------------------------------------    Critical Care Diagnoses:   SICU Consultation Note  =====================================================  HPI:  31 y/o female with hx hyperprolactinemia 2011 with amenorrhea. MRI was done which showed pituitary mass, started on Cabergoline with improvement. Cabergoline was stopped 2016, her periods remained normal.  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  Scheduled for Transphenoidal resection of suprasellar lesion (pituitary tumor, possible mucosal graft, possible nasoseptal flap. Fat pad was harvested and used to close dura. Lumbar drain removed at end of case, fluoroscein given.      SICU is consulted for Q1 NV checks.      (26 Jan 2023 18:37)      Surgery Information  OR time:      EBL:          IV Fluids:       Blood Products:   UOP:          PAST MEDICAL & SURGICAL HISTORY:  Pituitary tumor      Congenital absence of one kidney      Heart murmur      Elevated BP without diagnosis of hypertension      Obesity, morbid      No significant past surgical history        Home Meds: Home Medications:  no home medication:  (26 Jan 2023 18:52)    Allergies: Allergies    No Known Allergies    Intolerances      Soc:   Advanced Directives: Presumed Full Code     ROS:    REVIEW OF SYSTEMS    [ ] A ten-point review of systems was otherwise negative except as noted.  [ ] Due to altered mental status/intubation, subjective information were not able to be obtained from the patient. History was obtained, to the extent possible, from review of the chart and collateral sources of information.      CURRENT MEDICATIONS:   --------------------------------------------------------------------------------------  Neurologic Medications  acetaminophen     Tablet .. 1000 milliGRAM(s) Oral every 6 hours PRN Temp greater or equal to 38C (100.4F), Mild Pain (1 - 3)  fentaNYL    Injectable 25 MICROGram(s) IV Push every 5 minutes PRN Moderate Pain (4 - 6)  fentaNYL    Injectable 50 MICROGram(s) IV Push every 15 minutes PRN Severe Pain (7 - 10)  ondansetron Injectable 4 milliGRAM(s) IV Push once PRN Nausea and/or Vomiting  oxyCODONE    IR 5 milliGRAM(s) Oral every 6 hours PRN Moderate Pain (4 - 6)    Respiratory Medications    Cardiovascular Medications    Gastrointestinal Medications  lactated ringers. 1000 milliLiter(s) IV Continuous <Continuous>  polyethylene glycol 3350 17 Gram(s) Oral daily  senna 2 Tablet(s) Oral at bedtime    Genitourinary Medications    Hematologic/Oncologic Medications    Antimicrobial/Immunologic Medications  ceFAZolin   IVPB 2000 milliGRAM(s) IV Intermittent every 8 hours    Endocrine/Metabolic Medications    Topical/Other Medications    --------------------------------------------------------------------------------------    VITAL SIGNS, INS/OUTS (last 24 hours):  --------------------------------------------------------------------------------------  ICU Vital Signs Last 24 Hrs  T(C): 36.2 (03 Feb 2023 14:00), Max: 36.8 (03 Feb 2023 06:56)  T(F): 97.2 (03 Feb 2023 14:00), Max: 98.2 (03 Feb 2023 06:56)  HR: 83 (03 Feb 2023 14:15) (63 - 90)  BP: 140/86 (03 Feb 2023 14:15) (128/67 - 144/91)  BP(mean): 100 (03 Feb 2023 14:15) (91 - 106)  ABP: 142/71 (03 Feb 2023 14:15) (136/75 - 150/80)  ABP(mean): 97 (03 Feb 2023 14:15) (95 - 106)  RR: 19 (03 Feb 2023 14:15) (15 - 19)  SpO2: 98% (03 Feb 2023 14:15) (98% - 100%)    O2 Parameters below as of 03 Feb 2023 14:15  Patient On (Oxygen Delivery Method): nasal cannula  O2 Flow (L/min): 2        I&O's Summary    03 Feb 2023 07:01  -  03 Feb 2023 15:27  --------------------------------------------------------  IN: 30 mL / OUT: 110 mL / NET: -80 mL      --------------------------------------------------------------------------------------    EXAM:  General/Neuro  RASS: 0  GCS: 15, no deficits  Exam: Normal, NAD, alert, oriented x3, no focal deficits. PERRLA. Spontaneous movement of all extremities. Strength and sensation intact throughout.    Respiratory  Exam: Lungs clear to auscultation, Normal expansion/effort.    Cardiovascular  Exam: S1, S2.  Regular rate and rhythm. Minimal peripheral edema b/l.  Cardiac Rhythm: Normal Sinus Rhythm    GI  Exam: Abdomen soft, Non-tender, Non-distended.  Wound: abdominal incision with clean/dry pressure dressing in place  Current Diet: Regular    Tubes/Lines/Drains  [x] Peripheral IV  [] Central Venous Line     	[] R	[] L	[] IJ	[] Fem	[] SC        Type:	    Date Placed:   [] Arterial Line		[] R	[] L	[] Fem	[] Rad	[] Ax	Date Placed:   [] PICC:         	[] Midline		[] Mediport           [x] Urinary Catheter		Date Placed: 2/3/23    Extremities  Exam: Extremities warm, pink, well-perfused. SCDs in place.    Derm:  Exam: Good skin turgor, no skin breakdown.      :   Exam: Fernandez catheter in place, clear urine    LABS  --------------------------------------------------------------------------------------  Labs:  CAPILLARY BLOOD GLUCOSE                              12.2   11.72 )-----------( 319      ( 03 Feb 2023 14:35 )             37.5         02-03    140  |  108<H>  |  9   ----------------------------<  114<H>  3.8   |  25  |  0.85      Calcium, Total Serum: 9.2 mg/dL (02-03-23 @ 14:35)      LFTs:     Blood Gas Arterial, Lactate: 0.7 mmol/L (02-03-23 @ 11:42)  Blood Gas Arterial, Lactate: 0.8 mmol/L (02-03-23 @ 09:35)    ABG - ( 03 Feb 2023 11:42 )  pH: 7.44  /  pCO2: 36    /  pO2: 176   / HCO3: 24    / Base Excess: 0.6   /  SaO2: 99.3            ABG - ( 03 Feb 2023 09:35 )  pH: 7.45  /  pCO2: 34    /  pO2: 268   / HCO3: 24    / Base Excess: 0.0   /  SaO2: 99.6              Coags:                  --------------------------------------------------------------------------------------    OTHER LABS    IMAGING RESULTS      ASSESSMENT:  32y Female hx hyperprolactinemia 2011 with amenorrhea. MRI revealed pituitary mass, now POD#0 transphenoidal resection of hemorrhagic pituitary adenoma.     PLAN  NEUROLOGIC   - AAOx 3  - Pain control with Tylenol and Dilaudid   - CTH done post op, f/u read  - Monitor mustache dressing for leak  - Q1NV checks    RESPIRATORY   - Currently on RA   - Monitor SpO2 goal >92%  - Sinus precautions, no blowing or instrumentation of nose    CARDIOVASCULAR   - Monitor hemodynamics   - No MAP goals per primary team    GASTROINTESTINAL   - Diet: regular diet   - Bowl regimen to prevent straining    /RENAL   - IV fluids: LR @ 30 cc/hr   - Monitor BMP  - Strict I/Os  - Monitor electrolytes, replete PRN  - Maintain fernandez catheter  - DI watch f/u BMP q6     HEMATOLOGIC  - Monitor H/H   - DVT prophylaxis: SCDs for now  - Hold DVT ppx until 2/4 per primary team     INFECTIOUS DISEASE  - Monitor fever/WC  - Ancef x 24 hr    ENDOCRINE  - Monitor gluc  - ISS    LINES  - rad a line  - PIV  - Fernandez    Disposition:   SICU, under PACU care  --------------------------------------------------------------------------------------    Critical Care Diagnoses:   SICU Consultation Note  =====================================================  HPI:  33 y/o female with hx hyperprolactinemia 2011 with amenorrhea. MRI was done which showed pituitary mass, started on Cabergoline with improvement. Cabergoline was stopped 2016, her periods remained normal.  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  Scheduled for Transphenoidal resection of suprasellar lesion (pituitary tumor, possible mucosal graft, possible nasoseptal flap. Fat pad was harvested and used to close dura. Lumbar drain removed at end of case, fluoroscein given.      SICU is consulted for Q1 NV checks.      (26 Jan 2023 18:37)      Surgery Information  OR time:      EBL:          IV Fluids:       Blood Products:   UOP:          PAST MEDICAL & SURGICAL HISTORY:  Pituitary tumor      Congenital absence of one kidney      Heart murmur      Elevated BP without diagnosis of hypertension      Obesity, morbid      No significant past surgical history        Home Meds: Home Medications:  no home medication:  (26 Jan 2023 18:52)    Allergies: Allergies    No Known Allergies    Intolerances      Soc:   Advanced Directives: Presumed Full Code     ROS:    REVIEW OF SYSTEMS    [ ] A ten-point review of systems was otherwise negative except as noted.  [ ] Due to altered mental status/intubation, subjective information were not able to be obtained from the patient. History was obtained, to the extent possible, from review of the chart and collateral sources of information.      CURRENT MEDICATIONS:   --------------------------------------------------------------------------------------  Neurologic Medications  acetaminophen     Tablet .. 1000 milliGRAM(s) Oral every 6 hours PRN Temp greater or equal to 38C (100.4F), Mild Pain (1 - 3)  fentaNYL    Injectable 25 MICROGram(s) IV Push every 5 minutes PRN Moderate Pain (4 - 6)  fentaNYL    Injectable 50 MICROGram(s) IV Push every 15 minutes PRN Severe Pain (7 - 10)  ondansetron Injectable 4 milliGRAM(s) IV Push once PRN Nausea and/or Vomiting  oxyCODONE    IR 5 milliGRAM(s) Oral every 6 hours PRN Moderate Pain (4 - 6)    Respiratory Medications    Cardiovascular Medications    Gastrointestinal Medications  lactated ringers. 1000 milliLiter(s) IV Continuous <Continuous>  polyethylene glycol 3350 17 Gram(s) Oral daily  senna 2 Tablet(s) Oral at bedtime    Genitourinary Medications    Hematologic/Oncologic Medications    Antimicrobial/Immunologic Medications  ceFAZolin   IVPB 2000 milliGRAM(s) IV Intermittent every 8 hours    Endocrine/Metabolic Medications    Topical/Other Medications    --------------------------------------------------------------------------------------    VITAL SIGNS, INS/OUTS (last 24 hours):  --------------------------------------------------------------------------------------  ICU Vital Signs Last 24 Hrs  T(C): 36.2 (03 Feb 2023 14:00), Max: 36.8 (03 Feb 2023 06:56)  T(F): 97.2 (03 Feb 2023 14:00), Max: 98.2 (03 Feb 2023 06:56)  HR: 83 (03 Feb 2023 14:15) (63 - 90)  BP: 140/86 (03 Feb 2023 14:15) (128/67 - 144/91)  BP(mean): 100 (03 Feb 2023 14:15) (91 - 106)  ABP: 142/71 (03 Feb 2023 14:15) (136/75 - 150/80)  ABP(mean): 97 (03 Feb 2023 14:15) (95 - 106)  RR: 19 (03 Feb 2023 14:15) (15 - 19)  SpO2: 98% (03 Feb 2023 14:15) (98% - 100%)    O2 Parameters below as of 03 Feb 2023 14:15  Patient On (Oxygen Delivery Method): nasal cannula  O2 Flow (L/min): 2        I&O's Summary    03 Feb 2023 07:01  -  03 Feb 2023 15:27  --------------------------------------------------------  IN: 30 mL / OUT: 110 mL / NET: -80 mL      --------------------------------------------------------------------------------------    EXAM:  General/Neuro  RASS: 0  GCS: 15, no deficits  Exam: Normal, NAD, alert, oriented x3, no focal deficits. PERRLA. Spontaneous movement of all extremities. Strength and sensation intact throughout.    HEENT: mustache dressing c/d/i    Respiratory  Exam: Lungs clear to auscultation, Normal expansion/effort.    Cardiovascular  Exam: S1, S2.  Regular rate and rhythm. Minimal peripheral edema b/l.  Cardiac Rhythm: Normal Sinus Rhythm    GI  Exam: Abdomen soft, Non-tender, Non-distended.  Wound: abdominal incision with clean/dry pressure dressing in place  Current Diet: Regular    Tubes/Lines/Drains  [x] Peripheral IV  [] Central Venous Line     	[] R	[] L	[] IJ	[] Fem	[] SC        Type:	    Date Placed:   [] Arterial Line		[] R	[] L	[] Fem	[] Rad	[] Ax	Date Placed:   [] PICC:         	[] Midline		[] Mediport           [x] Urinary Catheter		Date Placed: 2/3/23    Extremities  Exam: Extremities warm, pink, well-perfused. SCDs in place.    Derm:  Exam: Good skin turgor, no skin breakdown.      :   Exam: Fernandez catheter in place, clear urine    LABS  --------------------------------------------------------------------------------------  Labs:  CAPILLARY BLOOD GLUCOSE                              12.2   11.72 )-----------( 319      ( 03 Feb 2023 14:35 )             37.5         02-03    140  |  108<H>  |  9   ----------------------------<  114<H>  3.8   |  25  |  0.85      Calcium, Total Serum: 9.2 mg/dL (02-03-23 @ 14:35)      LFTs:     Blood Gas Arterial, Lactate: 0.7 mmol/L (02-03-23 @ 11:42)  Blood Gas Arterial, Lactate: 0.8 mmol/L (02-03-23 @ 09:35)    ABG - ( 03 Feb 2023 11:42 )  pH: 7.44  /  pCO2: 36    /  pO2: 176   / HCO3: 24    / Base Excess: 0.6   /  SaO2: 99.3            ABG - ( 03 Feb 2023 09:35 )  pH: 7.45  /  pCO2: 34    /  pO2: 268   / HCO3: 24    / Base Excess: 0.0   /  SaO2: 99.6              Coags:                  --------------------------------------------------------------------------------------    OTHER LABS    IMAGING RESULTS      ASSESSMENT:  32y Female hx hyperprolactinemia 2011 with amenorrhea. MRI revealed pituitary mass, now POD#0 transphenoidal resection of hemorrhagic pituitary adenoma.     PLAN  NEUROLOGIC   - AAOx 3  - Pain control with Tylenol and Dilaudid   - CTH done post op, f/u read  - Monitor mustache dressing for leak  - Q1NV checks    RESPIRATORY   - Currently on RA   - Monitor SpO2 goal >92%  - Sinus precautions, no blowing or instrumentation of nose    CARDIOVASCULAR   - Monitor hemodynamics   - No MAP goals per primary team    GASTROINTESTINAL   - Diet: regular diet   - Bowl regimen to prevent straining    /RENAL   - IV fluids: LR @ 30 cc/hr   - Monitor BMP  - Strict I/Os  - Monitor electrolytes, replete PRN  - Maintain fernandez catheter  - DI watch f/u BMP q6     HEMATOLOGIC  - Monitor H/H   - DVT prophylaxis: SCDs for now  - Hold DVT ppx until 2/4 per primary team     INFECTIOUS DISEASE  - Monitor fever/WC  - Ancef x 24 hr    ENDOCRINE  - Monitor gluc  - ISS    LINES  - rad a line  - PIV  - Fernandez    Disposition:   SICU, under PACU care  --------------------------------------------------------------------------------------    Critical Care Diagnoses:

## 2023-02-03 NOTE — BRIEF OPERATIVE NOTE - OPERATION/FINDINGS
TSP for resection of hemorrhagic pituitary adenoma w abdominal fat graft, path sent, lumbar drain placed at beginning of case w fluorescein, removed at end, closed w Monocryl stitch. Abdomen incision closed w staples.

## 2023-02-03 NOTE — CONSULT NOTE ADULT - ATTENDING COMMENTS
I agree with the detailed interval history, physical, and plan, which I have reviewed and edited where appropriate'; also agree with notes/assessment with my team on service.  I have personally examined the patient.  I was physically present for the key portions of the evaluation and management (E/M) service provided.  I reviewed all the pertinent data.  The patient is a critical care patient with life threatening hemodynamic and metabolic instability in SICU.  The SICU team has a constant risk benefit analyzes discussion and coordinating care with the primary team and all consultants.   The patient is in SICU with the chief complaint and diagnosis mentioned in the note.   The plan will be specified in the note.  32y Female SP transphenoidal resection of hemorrhagic pituitary adenoma in SICU.  EXAM:  General/Neuro  GCS: 15, no deficits  Respiratory  Exam: Lungs clear   Cardiovascular  Exam: Regular rate   GI  Exam: Abdomen soft,    PLAN  NEUROLOGIC   -  Tylenol and Dilaudid   -Q1NV checks  RESPIRATORY   - RA   -CARDIO  - Monitor hemodynamics   GASTROINTESTINAL   - Diet: regular diet   /RENAL   - IV fluids: LR @ 30 cc/hr   HEMATOLOGIC  - DVT prophylaxis  INFECTIOUS DISEASE   Ancef x 24 hr  ENDOCRINE  - Monitor glucose

## 2023-02-03 NOTE — PROGRESS NOTE ADULT - SUBJECTIVE AND OBJECTIVE BOX
NEUROSURGERY POST OP CHECK   02-03-23 @ 15:47    Dx: 32y Female s/p TSP for resection of hemorrhagic pituitary adenoma w abdominal fat graft, path sent, lumbar drain placed at beginning of case w fluorescein, removed at end, closed w Monocryl stitch. Abdomen incision closed w staples. Patient doing well post op, no visual changes, having some headache. no fluorescein leaking from nose currently.     MEDICATIONS  (STANDING):  acetaminophen     Tablet .. 975 milliGRAM(s) Oral every 6 hours  ceFAZolin   IVPB 2000 milliGRAM(s) IV Intermittent every 8 hours  lactated ringers. 1000 milliLiter(s) (30 mL/Hr) IV Continuous <Continuous>  polyethylene glycol 3350 17 Gram(s) Oral daily  senna 2 Tablet(s) Oral at bedtime    MEDICATIONS  (PRN):  ondansetron Injectable 4 milliGRAM(s) IV Push once PRN Nausea and/or Vomiting  oxyCODONE    IR 5 milliGRAM(s) Oral every 6 hours PRN Moderate Pain (4 - 6)                          12.2   11.72 )-----------( 319      ( 03 Feb 2023 14:35 )             37.5     02-03    140  |  108<H>  |  9   ----------------------------<  114<H>  3.8   |  25  |  0.85    Ca    9.2      03 Feb 2023 14:35      I&O's Summary    03 Feb 2023 07:01  -  03 Feb 2023 15:47  --------------------------------------------------------  IN: 270 mL / OUT: 210 mL / NET: 60 mL        PHYSICAL EXAM:  T(C): 36.3 (02-03-23 @ 15:00), Max: 36.8 (02-03-23 @ 06:56)  HR: 81 (02-03-23 @ 15:30) (63 - 90)  BP: 144/107 (02-03-23 @ 15:30) (128/67 - 152/93)  RR: 17 (02-03-23 @ 15:30) (15 - 20)  SpO2: 95% (02-03-23 @ 15:30) (95% - 100%)    AOx3, appropriate, follows commands  PERRL, EOMI, face symmetrical   RIVERA x 4 with good strength   Sensation intact to light touch   No pronator drift   no leaking of fluorescin from nose

## 2023-02-03 NOTE — PROGRESS NOTE ADULT - ASSESSMENT
32y Female pmhx hyperprolactinemia 2011 with amenorrhea with pituitary mass, started on Cabergoline with improvement and meds were stoppedin 2016. Patient began to have worsening headaches for months, serial MRI revealed pituitary macroadenoma. Presented as SDA for removal     2/3/23 s/p TSP for resection of hemorrhagic pituitary adenoma w abdominal fat graft, path sent, lumbar drain placed at beginning of case w fluorescein, removed at end, closed w Monocryl stitch. Abdomen incision closed w staples. Patient doing well post op, no visual changes, having some headache. no fluorescein leaking from nose currently

## 2023-02-04 DIAGNOSIS — Z98.890 OTHER SPECIFIED POSTPROCEDURAL STATES: ICD-10-CM

## 2023-02-04 LAB
ANION GAP SERPL CALC-SCNC: 10 MMOL/L — SIGNIFICANT CHANGE UP (ref 7–14)
ANION GAP SERPL CALC-SCNC: 10 MMOL/L — SIGNIFICANT CHANGE UP (ref 7–14)
ANION GAP SERPL CALC-SCNC: 7 MMOL/L — SIGNIFICANT CHANGE UP (ref 7–14)
BUN SERPL-MCNC: 10 MG/DL — SIGNIFICANT CHANGE UP (ref 7–23)
BUN SERPL-MCNC: 12 MG/DL — SIGNIFICANT CHANGE UP (ref 7–23)
BUN SERPL-MCNC: 12 MG/DL — SIGNIFICANT CHANGE UP (ref 7–23)
CALCIUM SERPL-MCNC: 8.5 MG/DL — SIGNIFICANT CHANGE UP (ref 8.4–10.5)
CALCIUM SERPL-MCNC: 8.6 MG/DL — SIGNIFICANT CHANGE UP (ref 8.4–10.5)
CALCIUM SERPL-MCNC: 8.7 MG/DL — SIGNIFICANT CHANGE UP (ref 8.4–10.5)
CHLORIDE SERPL-SCNC: 105 MMOL/L — SIGNIFICANT CHANGE UP (ref 98–107)
CHLORIDE SERPL-SCNC: 105 MMOL/L — SIGNIFICANT CHANGE UP (ref 98–107)
CHLORIDE SERPL-SCNC: 108 MMOL/L — HIGH (ref 98–107)
CO2 SERPL-SCNC: 23 MMOL/L — SIGNIFICANT CHANGE UP (ref 22–31)
CO2 SERPL-SCNC: 24 MMOL/L — SIGNIFICANT CHANGE UP (ref 22–31)
CO2 SERPL-SCNC: 26 MMOL/L — SIGNIFICANT CHANGE UP (ref 22–31)
CREAT SERPL-MCNC: 0.78 MG/DL — SIGNIFICANT CHANGE UP (ref 0.5–1.3)
CREAT SERPL-MCNC: 0.8 MG/DL — SIGNIFICANT CHANGE UP (ref 0.5–1.3)
CREAT SERPL-MCNC: 0.93 MG/DL — SIGNIFICANT CHANGE UP (ref 0.5–1.3)
EGFR: 101 ML/MIN/1.73M2 — SIGNIFICANT CHANGE UP
EGFR: 104 ML/MIN/1.73M2 — SIGNIFICANT CHANGE UP
EGFR: 84 ML/MIN/1.73M2 — SIGNIFICANT CHANGE UP
GLUCOSE SERPL-MCNC: 111 MG/DL — HIGH (ref 70–99)
GLUCOSE SERPL-MCNC: 111 MG/DL — HIGH (ref 70–99)
GLUCOSE SERPL-MCNC: 159 MG/DL — HIGH (ref 70–99)
HCT VFR BLD CALC: 37 % — SIGNIFICANT CHANGE UP (ref 34.5–45)
HGB BLD-MCNC: 11.7 G/DL — SIGNIFICANT CHANGE UP (ref 11.5–15.5)
MAGNESIUM SERPL-MCNC: 2.3 MG/DL — SIGNIFICANT CHANGE UP (ref 1.6–2.6)
MAGNESIUM SERPL-MCNC: 2.5 MG/DL — SIGNIFICANT CHANGE UP (ref 1.6–2.6)
MCHC RBC-ENTMCNC: 28.5 PG — SIGNIFICANT CHANGE UP (ref 27–34)
MCHC RBC-ENTMCNC: 31.6 GM/DL — LOW (ref 32–36)
MCV RBC AUTO: 90.2 FL — SIGNIFICANT CHANGE UP (ref 80–100)
NRBC # BLD: 0 /100 WBCS — SIGNIFICANT CHANGE UP (ref 0–0)
NRBC # FLD: 0 K/UL — SIGNIFICANT CHANGE UP (ref 0–0)
PHOSPHATE SERPL-MCNC: 2.2 MG/DL — LOW (ref 2.5–4.5)
PHOSPHATE SERPL-MCNC: 3.3 MG/DL — SIGNIFICANT CHANGE UP (ref 2.5–4.5)
PLATELET # BLD AUTO: 335 K/UL — SIGNIFICANT CHANGE UP (ref 150–400)
POTASSIUM SERPL-MCNC: 3.7 MMOL/L — SIGNIFICANT CHANGE UP (ref 3.5–5.3)
POTASSIUM SERPL-MCNC: 3.9 MMOL/L — SIGNIFICANT CHANGE UP (ref 3.5–5.3)
POTASSIUM SERPL-MCNC: 4.1 MMOL/L — SIGNIFICANT CHANGE UP (ref 3.5–5.3)
POTASSIUM SERPL-SCNC: 3.7 MMOL/L — SIGNIFICANT CHANGE UP (ref 3.5–5.3)
POTASSIUM SERPL-SCNC: 3.9 MMOL/L — SIGNIFICANT CHANGE UP (ref 3.5–5.3)
POTASSIUM SERPL-SCNC: 4.1 MMOL/L — SIGNIFICANT CHANGE UP (ref 3.5–5.3)
RBC # BLD: 4.1 M/UL — SIGNIFICANT CHANGE UP (ref 3.8–5.2)
RBC # FLD: 13.2 % — SIGNIFICANT CHANGE UP (ref 10.3–14.5)
SODIUM SERPL-SCNC: 138 MMOL/L — SIGNIFICANT CHANGE UP (ref 135–145)
SODIUM SERPL-SCNC: 139 MMOL/L — SIGNIFICANT CHANGE UP (ref 135–145)
SODIUM SERPL-SCNC: 141 MMOL/L — SIGNIFICANT CHANGE UP (ref 135–145)
WBC # BLD: 14.77 K/UL — HIGH (ref 3.8–10.5)
WBC # FLD AUTO: 14.77 K/UL — HIGH (ref 3.8–10.5)

## 2023-02-04 PROCEDURE — 99233 SBSQ HOSP IP/OBS HIGH 50: CPT

## 2023-02-04 RX ORDER — SODIUM CHLORIDE 0.65 %
1 AEROSOL, SPRAY (ML) NASAL EVERY 8 HOURS
Refills: 0 | Status: DISCONTINUED | OUTPATIENT
Start: 2023-02-04 | End: 2023-02-05

## 2023-02-04 RX ORDER — POTASSIUM CHLORIDE 20 MEQ
10 PACKET (EA) ORAL
Refills: 0 | Status: COMPLETED | OUTPATIENT
Start: 2023-02-04 | End: 2023-02-04

## 2023-02-04 RX ORDER — POTASSIUM PHOSPHATE, MONOBASIC POTASSIUM PHOSPHATE, DIBASIC 236; 224 MG/ML; MG/ML
15 INJECTION, SOLUTION INTRAVENOUS ONCE
Refills: 0 | Status: COMPLETED | OUTPATIENT
Start: 2023-02-04 | End: 2023-02-04

## 2023-02-04 RX ORDER — HEPARIN SODIUM 5000 [USP'U]/ML
5000 INJECTION INTRAVENOUS; SUBCUTANEOUS EVERY 8 HOURS
Refills: 0 | Status: DISCONTINUED | OUTPATIENT
Start: 2023-02-04 | End: 2023-02-05

## 2023-02-04 RX ADMIN — Medication 975 MILLIGRAM(S): at 12:12

## 2023-02-04 RX ADMIN — Medication 975 MILLIGRAM(S): at 18:30

## 2023-02-04 RX ADMIN — Medication 975 MILLIGRAM(S): at 17:59

## 2023-02-04 RX ADMIN — Medication 100 MILLIGRAM(S): at 10:04

## 2023-02-04 RX ADMIN — Medication 100 MILLIEQUIVALENT(S): at 03:01

## 2023-02-04 RX ADMIN — POLYETHYLENE GLYCOL 3350 17 GRAM(S): 17 POWDER, FOR SOLUTION ORAL at 14:10

## 2023-02-04 RX ADMIN — HEPARIN SODIUM 5000 UNIT(S): 5000 INJECTION INTRAVENOUS; SUBCUTANEOUS at 22:07

## 2023-02-04 RX ADMIN — POTASSIUM PHOSPHATE, MONOBASIC POTASSIUM PHOSPHATE, DIBASIC 62.5 MILLIMOLE(S): 236; 224 INJECTION, SOLUTION INTRAVENOUS at 12:55

## 2023-02-04 RX ADMIN — Medication 100 MILLIEQUIVALENT(S): at 02:04

## 2023-02-04 RX ADMIN — Medication 975 MILLIGRAM(S): at 07:00

## 2023-02-04 RX ADMIN — Medication 975 MILLIGRAM(S): at 23:45

## 2023-02-04 RX ADMIN — Medication 975 MILLIGRAM(S): at 00:45

## 2023-02-04 RX ADMIN — Medication 975 MILLIGRAM(S): at 00:24

## 2023-02-04 RX ADMIN — SENNA PLUS 2 TABLET(S): 8.6 TABLET ORAL at 22:07

## 2023-02-04 RX ADMIN — Medication 975 MILLIGRAM(S): at 06:13

## 2023-02-04 RX ADMIN — SENNA PLUS 2 TABLET(S): 8.6 TABLET ORAL at 00:24

## 2023-02-04 RX ADMIN — Medication 975 MILLIGRAM(S): at 12:48

## 2023-02-04 RX ADMIN — Medication 100 MILLIEQUIVALENT(S): at 04:09

## 2023-02-04 RX ADMIN — Medication 100 MILLIGRAM(S): at 01:37

## 2023-02-04 RX ADMIN — Medication 1 SPRAY(S): at 22:06

## 2023-02-04 NOTE — PROGRESS NOTE ADULT - ASSESSMENT
ASSESSMENT:  32y Female hx hyperprolactinemia 2011 with amenorrhea. MRI revealed pituitary mass, now POD#1 transphenoidal resection of hemorrhagic pituitary adenoma.     PLAN  NEUROLOGIC   - AAOx 3  - Pain control with Tylenol and Dilaudid   - CTH done post op, f/u read  - Monitor mustache dressing for leak  - Q1NV checks    RESPIRATORY   - Currently on RA   - Monitor SpO2 goal >92%  - Sinus precautions, no blowing or instrumentation of nose    CARDIOVASCULAR   - Monitor hemodynamics   - No MAP goals per primary team    GASTROINTESTINAL   - Diet: regular diet   - Bowel regimen to prevent straining    /RENAL   - IV fluids: LR @ 30 cc/hr   - Monitor BMP  - Strict I/Os  - Monitor electrolytes, replete PRN  - Maintain fernandez catheter  - DI watch f/u BMP q6     HEMATOLOGIC  - Monitor H/H   - DVT prophylaxis: SCDs for now  - Hold DVT ppx until 2/4 per primary team     INFECTIOUS DISEASE  - Monitor fever/WC  - Ancef x 24 hr    ENDOCRINE  - Monitor gluc  - ISS    LINES  - rad a line  - PIV  - Fernandez    Disposition:   PACU, under SICU care  --------------------------------------------------------------------------------------    Critical Care Diagnoses:   ASSESSMENT:  32y Female hx hyperprolactinemia 2011 with amenorrhea. MRI revealed pituitary mass, now POD#1 transphenoidal resection of hemorrhagic pituitary adenoma.     PLAN  NEUROLOGIC   - AAOx 3  - Pain control with Tylenol and Dilaudid   - CTH done post op, f/u read  - Monitor mustache dressing for leak  - Q1NV checks    RESPIRATORY   - Currently on RA   - Monitor SpO2 goal >92%  - Sinus precautions, no blowing or instrumentation of nose    CARDIOVASCULAR   - Monitor hemodynamics   - No MAP goals per primary team    GASTROINTESTINAL   - Diet: regular diet   - Bowel regimen to prevent straining    /RENAL   - IV fluids: LR @ 30 cc/hr   - Monitor BMP  - Strict I/Os  - Monitor electrolytes, replete PRN  - Maintain fernandez catheter  - DI watch f/u BMP q6     HEMATOLOGIC  - Monitor H/H   - DVT prophylaxis: SCDs, HSQ  - Hold DVT ppx until 2/4 per primary team     INFECTIOUS DISEASE  - Monitor fever/WC  - Ancef x 24 hr    ENDOCRINE  - Monitor gluc  - ISS    LINES  - rad a line  - PIV  - Fernandez    Disposition:   PACU, under SICU care  --------------------------------------------------------------------------------------    Critical Care Diagnoses:   ASSESSMENT:  32y Female hx hyperprolactinemia 2011 with amenorrhea. MRI revealed pituitary mass, now POD#1 transphenoidal resection of hemorrhagic pituitary adenoma.     PLAN  NEUROLOGIC   - AAOx 3  - Pain control with Tylenol and Dilaudid   - CTH done post op, f/u read  - Monitor mustache dressing for leak  - Q1NV checks  - CTH (2/3): post op changes. No acute intracranial hemorrhage or acute territorial infarct.    RESPIRATORY   - Currently on RA   - Monitor SpO2 goal >92%  - Sinus precautions, no blowing or instrumentation of nose    CARDIOVASCULAR   - Monitor hemodynamics   - No MAP goals per primary team    GASTROINTESTINAL   - Diet: regular diet   - Bowel regimen to prevent straining    /RENAL   - IV fluids: LR @ 30 cc/hr   - Monitor BMP  - Strict I/Os  - Monitor electrolytes, replete PRN  - Maintain fernandez catheter  - DI watch f/u BMP q6     HEMATOLOGIC  - Monitor H/H   - DVT prophylaxis: SCDs, HSQ  - Hold DVT ppx until 2/4 per primary team     INFECTIOUS DISEASE  - Monitor fever/WC  - Ancef x 24 hr    ENDOCRINE  - Monitor gluc  - ISS    LINES  - rad a line  - PIV  - Fernandez    Disposition:   PACU, under SICU care  --------------------------------------------------------------------------------------    Critical Care Diagnoses:

## 2023-02-04 NOTE — PROGRESS NOTE ADULT - ATTENDING COMMENTS
I agree with the detailed interval history, physical, and plan, which I have reviewed and edited where appropriate'; also agree with notes/assessment with my team on service.  I have personally examined the patient.  I was physically present for the key portions of the evaluation and management (E/M) service provided.  I reviewed all the pertinent data.  The patient is a critical care patient with life threatening hemodynamic and metabolic instability in SICU.  The SICU team has a constant risk benefit analyzes discussion and coordinating care with the primary team and all consultants.   The patient is in SICU with the chief complaint and diagnosis mentioned in the note.   The plan will be specified in the note.  32y Female hx hyperprolactinemia-amenorrhea; sp transphenoidal resection of hemorrhagic pituitary adenoma.   EXAM:  General/Neuro  GCS: 15, no deficits  Exam: Normal strength and sensation intact throughout.  Respiratory  Exam: Lungs clear   Cardiovascular  Exam: Regular rate   GI  Exam: Abdomen soft,  PLAN  NEUROLOGIC   - Tylenol and Dilaudid   - Q1NV checks  RESPIRATORY   - RA   CARDIOVASCULAR   - Monitor hemodynamics   GASTROINTESTINAL   - Diet: regular diet   /RENAL   - IV fluids: LR @ 30 cc/hr   HEMATOLOGIC  - DVT prophylaxis: SCDs, HSQ  -INFECTIOUS DISEASE  - Monitor fever/WC  ENDOCRINE  - Monitor glucose  Disposition:   SICU

## 2023-02-04 NOTE — PROGRESS NOTE ADULT - SUBJECTIVE AND OBJECTIVE BOX
HPI:  31 y/o female with hx hyperprolactinemia 2011 with amenorrhea. MRI was done which showed pituitary mass, started on Cabergoline with improvement  Cabergoline was stopped 2016, her periods remained normal.  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  Scheduled for Transphenoidal resection of suprasellar lesion (pituitary tumor, possible mucosal graft, possible nasoseptal flap.    Pt is a poor historian.      (26 Jan 2023 18:37)    No issues overnight  ICU Vital Signs Last 24 Hrs  T(C): 37.1 (04 Feb 2023 02:00), Max: 37.1 (04 Feb 2023 02:00)  T(F): 98.8 (04 Feb 2023 02:00), Max: 98.8 (04 Feb 2023 02:00)  HR: 71 (04 Feb 2023 02:00) (63 - 91)  BP: 146/88 (04 Feb 2023 02:00) (126/90 - 161/97)  BP(mean): 99 (04 Feb 2023 02:00) (91 - 123)  ABP: 121/99 (04 Feb 2023 02:00) (121/95 - 160/98)  ABP(mean): 110 (04 Feb 2023 02:00) (95 - 149)  RR: 15 (04 Feb 2023 02:00) (14 - 20)  SpO2: 95% (04 Feb 2023 02:00) (95% - 100%)    O2 Parameters below as of 04 Feb 2023 02:00  Patient On (Oxygen Delivery Method): room air    I/O: 1395/1665    AAO X 3  PERRLA, EOMI  CN 2-12 grossly intact  RIVERA strength 5/5  No dysmetria or drift    MEDICATIONS  (STANDING):  acetaminophen     Tablet .. 975 milliGRAM(s) Oral every 6 hours  ceFAZolin   IVPB 2000 milliGRAM(s) IV Intermittent every 8 hours  influenza   Vaccine 0.5 milliLiter(s) IntraMuscular once  polyethylene glycol 3350 17 Gram(s) Oral daily  potassium chloride  10 mEq/100 mL IVPB 10 milliEquivalent(s) IV Intermittent every 1 hour  senna 2 Tablet(s) Oral at bedtime    MEDICATIONS  (PRN):  ondansetron Injectable 4 milliGRAM(s) IV Push once PRN Nausea and/or Vomiting  oxyCODONE    IR 5 milliGRAM(s) Oral every 6 hours PRN Moderate Pain (4 - 6)                          12.4   12.66 )-----------( 322      ( 03 Feb 2023 18:31 )             37.7     02-04    138  |  105  |  12  ----------------------------<  159<H>  3.7   |  23  |  0.80    Ca    8.6      04 Feb 2023 00:34  Phos  3.3     02-04  Mg     2.50     02-04

## 2023-02-04 NOTE — PROGRESS NOTE ADULT - SUBJECTIVE AND OBJECTIVE BOX
SUBJECTIVE:  Pt seen & examined at bedside  No acute events overnight  Pain controlled  No complaints  On bedrest  Tolerating diet  No F/C, N/V, CP/SOB  No nasal drainage    Vital Signs Last 24 Hrs  T(C): 36.8 (04 Feb 2023 08:00), Max: 37.1 (04 Feb 2023 02:00)  T(F): 98.2 (04 Feb 2023 08:00), Max: 98.8 (04 Feb 2023 02:00)  HR: 69 (04 Feb 2023 11:00) (66 - 91)  BP: 125/79 (04 Feb 2023 11:00) (120/70 - 161/97)  BP(mean): 89 (04 Feb 2023 11:00) (80 - 123)  RR: 20 (04 Feb 2023 11:00) (14 - 21)  SpO2: 99% (04 Feb 2023 11:00) (95% - 100%)    Parameters below as of 04 Feb 2023 07:00  Patient On (Oxygen Delivery Method): room air  General: NAD, A+Ox3  Respiratory: No respiratory distress, stridor, or stertor  Face:  Symmetric without masses or lesions  OU: EOMI  Nose: no drainage anteriorly  OC/OP: tongue normal, floor of mouth WNL, no masses or lesions, OP without bleeding or drainage  Neck: soft/flat      A/P: 33yo F pituitary hemorrhagic adenoma sp intrathecal flourescein,  TSPR 2/3, c/b intraop CSF leak, repaired with fat graft (no nasoseptal flap)  -f/u MRI, CT with post operative changes  -f/u UOP, sodium  -montitor for CSF leak  -rest of care per sicu/nsgy

## 2023-02-04 NOTE — PROGRESS NOTE ADULT - SUBJECTIVE AND OBJECTIVE BOX
SICU Progress Note  =====================================================  HPI:  31 y/o female with hx hyperprolactinemia 2011 with amenorrhea. MRI was done which showed pituitary mass, started on Cabergoline with improvement. Cabergoline was stopped 2016, her periods remained normal.  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  Scheduled for Transphenoidal resection of suprasellar lesion (pituitary tumor, possible mucosal graft, possible nasoseptal flap. Fat pad was harvested and used to close dura. Lumbar drain removed at end of case, fluoroscein given.      SICU is consulted for Q1 NV checks.      (26 Jan 2023 18:37)      Surgery Information  OR time:      EBL:          IV Fluids:       Blood Products:   UOP:          PAST MEDICAL & SURGICAL HISTORY:  Pituitary tumor      Congenital absence of one kidney      Heart murmur      Elevated BP without diagnosis of hypertension      Obesity, morbid      No significant past surgical history        Home Meds: Home Medications:  no home medication:  (26 Jan 2023 18:52)    Allergies: Allergies    No Known Allergies    Intolerances      Soc:   Advanced Directives: Presumed Full Code     ROS:    REVIEW OF SYSTEMS    [ ] A ten-point review of systems was otherwise negative except as noted.  [ ] Due to altered mental status/intubation, subjective information were not able to be obtained from the patient. History was obtained, to the extent possible, from review of the chart and collateral sources of information.      CURRENT MEDICATIONS:   --------------------------------------------------------------------------------------  Neurologic Medications  acetaminophen     Tablet .. 1000 milliGRAM(s) Oral every 6 hours PRN Temp greater or equal to 38C (100.4F), Mild Pain (1 - 3)  fentaNYL    Injectable 25 MICROGram(s) IV Push every 5 minutes PRN Moderate Pain (4 - 6)  fentaNYL    Injectable 50 MICROGram(s) IV Push every 15 minutes PRN Severe Pain (7 - 10)  ondansetron Injectable 4 milliGRAM(s) IV Push once PRN Nausea and/or Vomiting  oxyCODONE    IR 5 milliGRAM(s) Oral every 6 hours PRN Moderate Pain (4 - 6)    Respiratory Medications    Cardiovascular Medications    Gastrointestinal Medications  lactated ringers. 1000 milliLiter(s) IV Continuous <Continuous>  polyethylene glycol 3350 17 Gram(s) Oral daily  senna 2 Tablet(s) Oral at bedtime    Genitourinary Medications    Hematologic/Oncologic Medications    Antimicrobial/Immunologic Medications  ceFAZolin   IVPB 2000 milliGRAM(s) IV Intermittent every 8 hours    Endocrine/Metabolic Medications    Topical/Other Medications    --------------------------------------------------------------------------------------    VITAL SIGNS, INS/OUTS (last 24 hours):  --------------------------------------------------------------------------------------  ICU Vital Signs Last 24 Hrs  T(C): 36.2 (03 Feb 2023 14:00), Max: 36.8 (03 Feb 2023 06:56)  T(F): 97.2 (03 Feb 2023 14:00), Max: 98.2 (03 Feb 2023 06:56)  HR: 83 (03 Feb 2023 14:15) (63 - 90)  BP: 140/86 (03 Feb 2023 14:15) (128/67 - 144/91)  BP(mean): 100 (03 Feb 2023 14:15) (91 - 106)  ABP: 142/71 (03 Feb 2023 14:15) (136/75 - 150/80)  ABP(mean): 97 (03 Feb 2023 14:15) (95 - 106)  RR: 19 (03 Feb 2023 14:15) (15 - 19)  SpO2: 98% (03 Feb 2023 14:15) (98% - 100%)    O2 Parameters below as of 03 Feb 2023 14:15  Patient On (Oxygen Delivery Method): nasal cannula  O2 Flow (L/min): 2        I&O's Summary    03 Feb 2023 07:01  -  03 Feb 2023 15:27  --------------------------------------------------------  IN: 30 mL / OUT: 110 mL / NET: -80 mL      --------------------------------------------------------------------------------------    EXAM:  General/Neuro  RASS: 0  GCS: 15, no deficits  Exam: Normal, NAD, alert, oriented x3, no focal deficits. PERRLA. Spontaneous movement of all extremities. Strength and sensation intact throughout.    HEENT: mustache dressing c/d/i    Respiratory  Exam: Lungs clear to auscultation, Normal expansion/effort.    Cardiovascular  Exam: S1, S2.  Regular rate and rhythm. Minimal peripheral edema b/l.  Cardiac Rhythm: Normal Sinus Rhythm    GI  Exam: Abdomen soft, Non-tender, Non-distended.  Wound: abdominal incision with clean/dry pressure dressing in place  Current Diet: Regular    Tubes/Lines/Drains  [x] Peripheral IV  [] Central Venous Line     	[] R	[] L	[] IJ	[] Fem	[] SC        Type:	    Date Placed:   [] Arterial Line		[] R	[] L	[] Fem	[] Rad	[] Ax	Date Placed:   [] PICC:         	[] Midline		[] Mediport           [x] Urinary Catheter		Date Placed: 2/3/23    Extremities  Exam: Extremities warm, pink, well-perfused. SCDs in place.    Derm:  Exam: Good skin turgor, no skin breakdown.      :   Exam: Olsen catheter in place, clear urine    LABS  --------------------------------------------------------------------------------------  Labs:  CAPILLARY BLOOD GLUCOSE                              12.2   11.72 )-----------( 319      ( 03 Feb 2023 14:35 )             37.5         02-03    140  |  108<H>  |  9   ----------------------------<  114<H>  3.8   |  25  |  0.85      Calcium, Total Serum: 9.2 mg/dL (02-03-23 @ 14:35)      LFTs:     Blood Gas Arterial, Lactate: 0.7 mmol/L (02-03-23 @ 11:42)  Blood Gas Arterial, Lactate: 0.8 mmol/L (02-03-23 @ 09:35)    ABG - ( 03 Feb 2023 11:42 )  pH: 7.44  /  pCO2: 36    /  pO2: 176   / HCO3: 24    / Base Excess: 0.6   /  SaO2: 99.3            ABG - ( 03 Feb 2023 09:35 )  pH: 7.45  /  pCO2: 34    /  pO2: 268   / HCO3: 24    / Base Excess: 0.0   /  SaO2: 99.6           SICU Progress Note  =====================================================  Interval Events:  - No overnight events  - F/u post-op brain MRI    HPI:  33 y/o female with hx hyperprolactinemia 2011 with amenorrhea. MRI was done which showed pituitary mass, started on Cabergoline with improvement. Cabergoline was stopped 2016, her periods remained normal.  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  Scheduled for Transphenoidal resection of suprasellar lesion (pituitary tumor, possible mucosal graft, possible nasoseptal flap. Fat pad was harvested and used to close dura. Lumbar drain removed at end of case, fluoroscein given.      SICU is consulted for Q1 NV checks.      (26 Jan 2023 18:37)      Surgery Information  OR time:      EBL:          IV Fluids:       Blood Products:   UOP:          PAST MEDICAL & SURGICAL HISTORY:  Pituitary tumor      Congenital absence of one kidney      Heart murmur      Elevated BP without diagnosis of hypertension      Obesity, morbid      No significant past surgical history        Home Meds: Home Medications:  no home medication:  (26 Jan 2023 18:52)    Allergies: Allergies    No Known Allergies    Intolerances      Soc:   Advanced Directives: Presumed Full Code     ROS:    REVIEW OF SYSTEMS    [ ] A ten-point review of systems was otherwise negative except as noted.  [ ] Due to altered mental status/intubation, subjective information were not able to be obtained from the patient. History was obtained, to the extent possible, from review of the chart and collateral sources of information.      CURRENT MEDICATIONS:   --------------------------------------------------------------------------------------  Neurologic Medications  acetaminophen     Tablet .. 1000 milliGRAM(s) Oral every 6 hours PRN Temp greater or equal to 38C (100.4F), Mild Pain (1 - 3)  fentaNYL    Injectable 25 MICROGram(s) IV Push every 5 minutes PRN Moderate Pain (4 - 6)  fentaNYL    Injectable 50 MICROGram(s) IV Push every 15 minutes PRN Severe Pain (7 - 10)  ondansetron Injectable 4 milliGRAM(s) IV Push once PRN Nausea and/or Vomiting  oxyCODONE    IR 5 milliGRAM(s) Oral every 6 hours PRN Moderate Pain (4 - 6)    Respiratory Medications    Cardiovascular Medications    Gastrointestinal Medications  lactated ringers. 1000 milliLiter(s) IV Continuous <Continuous>  polyethylene glycol 3350 17 Gram(s) Oral daily  senna 2 Tablet(s) Oral at bedtime    Genitourinary Medications    Hematologic/Oncologic Medications    Antimicrobial/Immunologic Medications  ceFAZolin   IVPB 2000 milliGRAM(s) IV Intermittent every 8 hours    Endocrine/Metabolic Medications    Topical/Other Medications    --------------------------------------------------------------------------------------    VITAL SIGNS, INS/OUTS (last 24 hours):  --------------------------------------------------------------------------------------  ICU Vital Signs Last 24 Hrs  T(C): 36.2 (03 Feb 2023 14:00), Max: 36.8 (03 Feb 2023 06:56)  T(F): 97.2 (03 Feb 2023 14:00), Max: 98.2 (03 Feb 2023 06:56)  HR: 83 (03 Feb 2023 14:15) (63 - 90)  BP: 140/86 (03 Feb 2023 14:15) (128/67 - 144/91)  BP(mean): 100 (03 Feb 2023 14:15) (91 - 106)  ABP: 142/71 (03 Feb 2023 14:15) (136/75 - 150/80)  ABP(mean): 97 (03 Feb 2023 14:15) (95 - 106)  RR: 19 (03 Feb 2023 14:15) (15 - 19)  SpO2: 98% (03 Feb 2023 14:15) (98% - 100%)    O2 Parameters below as of 03 Feb 2023 14:15  Patient On (Oxygen Delivery Method): nasal cannula  O2 Flow (L/min): 2        I&O's Summary    03 Feb 2023 07:01  -  03 Feb 2023 15:27  --------------------------------------------------------  IN: 30 mL / OUT: 110 mL / NET: -80 mL      --------------------------------------------------------------------------------------    EXAM:  General/Neuro  RASS: 0  GCS: 15, no deficits  Exam: Normal, NAD, alert, oriented x3, no focal deficits. PERRLA. Spontaneous movement of all extremities. Strength and sensation intact throughout.    HEENT: mustache dressing c/d/i    Respiratory  Exam: Lungs clear to auscultation, Normal expansion/effort.    Cardiovascular  Exam: S1, S2.  Regular rate and rhythm. Minimal peripheral edema b/l.  Cardiac Rhythm: Normal Sinus Rhythm    GI  Exam: Abdomen soft, Non-tender, Non-distended.  Wound: abdominal incision with clean/dry pressure dressing in place  Current Diet: Regular    Tubes/Lines/Drains  [x] Peripheral IV  [] Central Venous Line     	[] R	[] L	[] IJ	[] Fem	[] SC        Type:	    Date Placed:   [] Arterial Line		[] R	[] L	[] Fem	[] Rad	[] Ax	Date Placed:   [] PICC:         	[] Midline		[] Mediport           [x] Urinary Catheter		Date Placed: 2/3/23    Extremities  Exam: Extremities warm, pink, well-perfused. SCDs in place.    Derm:  Exam: Good skin turgor, no skin breakdown.      :   Exam: Olsen catheter in place, clear urine    LABS  --------------------------------------------------------------------------------------  Labs:  CAPILLARY BLOOD GLUCOSE                              12.2   11.72 )-----------( 319      ( 03 Feb 2023 14:35 )             37.5         02-03    140  |  108<H>  |  9   ----------------------------<  114<H>  3.8   |  25  |  0.85      Calcium, Total Serum: 9.2 mg/dL (02-03-23 @ 14:35)      LFTs:     Blood Gas Arterial, Lactate: 0.7 mmol/L (02-03-23 @ 11:42)  Blood Gas Arterial, Lactate: 0.8 mmol/L (02-03-23 @ 09:35)    ABG - ( 03 Feb 2023 11:42 )  pH: 7.44  /  pCO2: 36    /  pO2: 176   / HCO3: 24    / Base Excess: 0.6   /  SaO2: 99.3            ABG - ( 03 Feb 2023 09:35 )  pH: 7.45  /  pCO2: 34    /  pO2: 268   / HCO3: 24    / Base Excess: 0.0   /  SaO2: 99.6

## 2023-02-04 NOTE — PROGRESS NOTE ADULT - ASSESSMENT
Dx: 32y Female s/p TSP for resection of hemorrhagic pituitary adenoma w abdominal fat graft, path sent, lumbar drain placed at beginning of case w fluorescein, removed at end, closed w Monocryl stitch. Abdomen incision closed w staples. Patient doing well post op, no visual changes, having some headache. no fluorescein leaking from nose currently.     2/4: Stable POD # 1 S/P TSP for resection of hemorrhagic pituitary adenoma. No sign of CSF leak or DI

## 2023-02-05 ENCOUNTER — TRANSCRIPTION ENCOUNTER (OUTPATIENT)
Age: 33
End: 2023-02-05

## 2023-02-05 VITALS
HEART RATE: 57 BPM | DIASTOLIC BLOOD PRESSURE: 78 MMHG | SYSTOLIC BLOOD PRESSURE: 129 MMHG | RESPIRATION RATE: 18 BRPM | OXYGEN SATURATION: 100 %

## 2023-02-05 LAB
ANION GAP SERPL CALC-SCNC: 9 MMOL/L — SIGNIFICANT CHANGE UP (ref 7–14)
BUN SERPL-MCNC: 11 MG/DL — SIGNIFICANT CHANGE UP (ref 7–23)
CALCIUM SERPL-MCNC: 8.8 MG/DL — SIGNIFICANT CHANGE UP (ref 8.4–10.5)
CHLORIDE SERPL-SCNC: 105 MMOL/L — SIGNIFICANT CHANGE UP (ref 98–107)
CO2 SERPL-SCNC: 23 MMOL/L — SIGNIFICANT CHANGE UP (ref 22–31)
CREAT SERPL-MCNC: 0.77 MG/DL — SIGNIFICANT CHANGE UP (ref 0.5–1.3)
EGFR: 106 ML/MIN/1.73M2 — SIGNIFICANT CHANGE UP
GLUCOSE SERPL-MCNC: 105 MG/DL — HIGH (ref 70–99)
HCT VFR BLD CALC: 37.1 % — SIGNIFICANT CHANGE UP (ref 34.5–45)
HGB BLD-MCNC: 11.9 G/DL — SIGNIFICANT CHANGE UP (ref 11.5–15.5)
MAGNESIUM SERPL-MCNC: 2.2 MG/DL — SIGNIFICANT CHANGE UP (ref 1.6–2.6)
MCHC RBC-ENTMCNC: 28.5 PG — SIGNIFICANT CHANGE UP (ref 27–34)
MCHC RBC-ENTMCNC: 32.1 GM/DL — SIGNIFICANT CHANGE UP (ref 32–36)
MCV RBC AUTO: 89 FL — SIGNIFICANT CHANGE UP (ref 80–100)
NRBC # BLD: 0 /100 WBCS — SIGNIFICANT CHANGE UP (ref 0–0)
NRBC # FLD: 0 K/UL — SIGNIFICANT CHANGE UP (ref 0–0)
PHOSPHATE SERPL-MCNC: 3.3 MG/DL — SIGNIFICANT CHANGE UP (ref 2.5–4.5)
PLATELET # BLD AUTO: 205 K/UL — SIGNIFICANT CHANGE UP (ref 150–400)
POTASSIUM SERPL-MCNC: 3.8 MMOL/L — SIGNIFICANT CHANGE UP (ref 3.5–5.3)
POTASSIUM SERPL-SCNC: 3.8 MMOL/L — SIGNIFICANT CHANGE UP (ref 3.5–5.3)
RBC # BLD: 4.17 M/UL — SIGNIFICANT CHANGE UP (ref 3.8–5.2)
RBC # FLD: 13.6 % — SIGNIFICANT CHANGE UP (ref 10.3–14.5)
SODIUM SERPL-SCNC: 137 MMOL/L — SIGNIFICANT CHANGE UP (ref 135–145)
WBC # BLD: 11.2 K/UL — HIGH (ref 3.8–10.5)
WBC # FLD AUTO: 11.2 K/UL — HIGH (ref 3.8–10.5)

## 2023-02-05 PROCEDURE — 99232 SBSQ HOSP IP/OBS MODERATE 35: CPT

## 2023-02-05 RX ORDER — ACETAMINOPHEN 500 MG
3 TABLET ORAL
Qty: 0 | Refills: 0 | DISCHARGE
Start: 2023-02-05

## 2023-02-05 RX ORDER — POLYETHYLENE GLYCOL 3350 17 G/17G
17 POWDER, FOR SOLUTION ORAL
Qty: 0 | Refills: 0 | DISCHARGE
Start: 2023-02-05

## 2023-02-05 RX ORDER — SENNA PLUS 8.6 MG/1
2 TABLET ORAL
Qty: 0 | Refills: 0 | DISCHARGE
Start: 2023-02-05

## 2023-02-05 RX ORDER — OXYCODONE HYDROCHLORIDE 5 MG/1
1 TABLET ORAL
Qty: 0 | Refills: 0 | DISCHARGE
Start: 2023-02-05

## 2023-02-05 RX ORDER — SODIUM CHLORIDE 0.65 %
1 AEROSOL, SPRAY (ML) NASAL
Qty: 0 | Refills: 0 | DISCHARGE
Start: 2023-02-05

## 2023-02-05 RX ADMIN — Medication 975 MILLIGRAM(S): at 00:00

## 2023-02-05 RX ADMIN — Medication 975 MILLIGRAM(S): at 05:32

## 2023-02-05 RX ADMIN — Medication 975 MILLIGRAM(S): at 06:28

## 2023-02-05 RX ADMIN — HEPARIN SODIUM 5000 UNIT(S): 5000 INJECTION INTRAVENOUS; SUBCUTANEOUS at 05:32

## 2023-02-05 RX ADMIN — Medication 1 SPRAY(S): at 12:21

## 2023-02-05 RX ADMIN — Medication 975 MILLIGRAM(S): at 12:21

## 2023-02-05 RX ADMIN — Medication 975 MILLIGRAM(S): at 12:31

## 2023-02-05 NOTE — PROGRESS NOTE ADULT - SUBJECTIVE AND OBJECTIVE BOX
24 HOUR EVENTS:    -DVT ppx restarted  -Q2 NV checks  -Saline nasal spray ordered  -No signs of DI     NEURO  RASS (if intubated): 		CAM ICU (if concern for delirium):  Exam:   Meds: acetaminophen     Tablet .. 975 milliGRAM(s) Oral every 6 hours  oxyCODONE    IR 5 milliGRAM(s) Oral every 6 hours PRN Moderate Pain (4 - 6)      RESPIRATORY  RR: 19 (02-04-23 @ 22:00) (11 - 23)  SpO2: 100% (02-04-23 @ 22:00) (95% - 100%)  Wt(kg): --  Exam:  Mechanical Ventilation:   ABG - ( 03 Feb 2023 11:42 )  pH: 7.44  /  pCO2: 36    /  pO2: 176   / HCO3: 24    / Base Excess: 0.6   /  SaO2: 99.3    Lactate: x                Meds:     CARDIOVASCULAR  HR: 67 (02-04-23 @ 22:00) (56 - 83)  BP: 144/106 (02-04-23 @ 22:00) (120/70 - 154/89)  BP(mean): 116 (02-04-23 @ 22:00) (80 - 116)  ABP: 112/98 (02-04-23 @ 06:00) (112/89 - 127/85)  ABP(mean): 105 (02-04-23 @ 06:00) (97 - 110)  Wt(kg): --  CVP(cm H2O): --      Exam:   Cardiac Rhythm:   Perfusion     [ ]Adequate   [ ]Inadequate  Mentation   [ ]Normal       [ ]Reduced  Extremities  [ ]Warm         [ ]Cool  Volume Status [ ]Hypervolemic [ ]Euvolemic [ ]Hypovolemic  Meds:     GI/NUTRITION  Exam:   Diet:   Meds: polyethylene glycol 3350 17 Gram(s) Oral daily  senna 2 Tablet(s) Oral at bedtime      GENITOURINARY  I&O's Detail    02-03 @ 07:01  -  02-04 @ 07:00  --------------------------------------------------------  IN:    IV PiggyBack: 350 mL    Lactated Ringers: 30 mL    Oral Fluid: 1115 mL  Total IN: 1495 mL    OUT:    Indwelling Catheter - Urethral (mL): 2425 mL  Total OUT: 2425 mL    Total NET: -930 mL      02-04 @ 07:01  -  02-05 @ 00:15  --------------------------------------------------------  IN:    IV PiggyBack: 239 mL    Oral Fluid: 800 mL  Total IN: 1039 mL    OUT:    Indwelling Catheter - Urethral (mL): 775 mL  Total OUT: 775 mL    Total NET: 264 mL          02-04    141  |  108<H>  |  12  ----------------------------<  111<H>  3.9   |  26  |  0.93    Ca    8.7      04 Feb 2023 11:50  Phos  2.2     02-04  Mg     2.30     02-04      Meds:     HEMATOLOGIC  Meds: heparin   Injectable 5000 Unit(s) SubCutaneous every 8 hours                          11.7   14.77 )-----------( 335      ( 04 Feb 2023 06:45 )             37.0         INFECTIOUS DISEASES  T(C): 36.7 (02-04-23 @ 20:00), Max: 37.1 (02-04-23 @ 02:00)  Wt(kg): --  WBC Count: 14.77 K/uL (02-04 @ 06:45)    Recent Cultures:    Meds: influenza   Vaccine 0.5 milliLiter(s) IntraMuscular once      ENDOCRINE  Capillary Blood Glucose    Meds:     ACCESS DEVICES:  [ ] Peripheral IV  [ ] Central Venous Line		[ ] R	[ ] L	[ ] IJ	[ ] Fem	[ ] SC	Placed:   [ ] Arterial Line			[ ] R	[ ] L	[ ] Fem	[ ] Rad	[ ] Ax	Placed:   [ ] PICC:					[ ] Mediport  [ ] Urinary Catheter, Date Placed:   [ ] Necessity of urinary, arterial, and venous catheters discussed    OTHER MEDICATIONS:  sodium chloride 0.65% Nasal 1 Spray(s) Both Nostrils every 8 hours PRN      IMAGING:

## 2023-02-05 NOTE — DISCHARGE NOTE NURSING/CASE MANAGEMENT/SOCIAL WORK - PATIENT PORTAL LINK FT
You can access the FollowMyHealth Patient Portal offered by [facility] by registering at the following website: [email or web address]. By joining [facility]’s FollowMyHealth portal, you will also be able to view your health information using other applications (apps) compatible with our system.

## 2023-02-05 NOTE — DISCHARGE NOTE PROVIDER - NSDCFUADDINST_GEN_ALL_CORE_FT
- You had surgery on 2/3/22. The surgery you had was a transphenoidal resection of pituitary tumor, with abdominal fat graft, insertion , then removal of lumbar drain.     - Remove bandage from incision site on post op day 3 if it was not removed by the surgical team prior to discharge. Once removed, incision site does not need a bandage or ointment on it. If you have steri strips (small, skinny beige strips), they will eventually fall off over time. Do not pull at steri strips. If steri strips are more than snf off, you may remove them. Do not touch or scratch incision to prevent infection.    - If your incision is closed with clear sutures, these are absorbable and will dissolve over time. If you see metallic staples or black stitches, these will need to be removed in the office 7-14 days after surgery. Your surgeon will tell you at your follow up appt when your sutures/staples will be removed, if applicable.  Do not pick or scratch at incision.     - Shower daily with shampoo/soap on post operative day 4 (DATE: ) Avoid long soaks and do not submerge incision in water (no baths.) Allow soap and water to run over the incision. Pat incision area dry with clean towel- do not scrub. Please shower regularly to ensure incision stays clean to avoid post operative infections.  You may have a body shower daily, as long as your head incision is covered by a shower cap and does not get wet until post op day 4.     - Notify your surgeon if you notice increased redness, drainage or your incision area opening.     - Return to ER immediately for high fevers, severe headache, vomiting, lethargy or weakness    - Please call your neurosurgeon following discharge to make follow up appointment in 1 week after discharge unless otherwise specified. See contact information.    - Prescription post operative medication has been sent to [facility] in the hospital. All post operative prescriptions should be picked up before departing the hospital. You can also take over the counter tylenol for pain as needed.     - Ambulate as tolerate. Continue with all "activities of daily living." Avoid strenuous activity or heavy lifting until cleared for additional activity at your follow up appointment. You cannot drive while taking narcotics (oxycodone, valium, etc.)     - Do not return to work or school until cleared by your neurosurgeon at your follow up visit unless specified to you during your hospital stay    - Do not take any blood thinning medications such as aspirin, motrin, ibuprofen, warfarin, coumadin, plavix, heparin, lovenox, Xarelto, Eliquis etc. until cleared by your neurosurgeon    - Surgery, anesthesia, and pain medications can cause constipation. Please take over the counter stool softeners daily until regular bowel movements are achieved. Examples include Miralax, Senna, Colace, Milk of Magnesia, or Dulcolax suppositories. Please consult drug store pharmacist or pediatrician if there are question about dosing if the patient is pediatric.

## 2023-02-05 NOTE — PROGRESS NOTE ADULT - SUBJECTIVE AND OBJECTIVE BOX
HPI:  31 y/o female with hx hyperprolactinemia 2011 with amenorrhea. MRI was done which showed pituitary mass, started on Cabergoline with improvement  Cabergoline was stopped 2016, her periods remained normal.  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  Scheduled for Transphenoidal resection of suprasellar lesion (pituitary tumor, possible mucosal graft, possible nasoseptal flap.    Pt is a poor historian.      (26 Jan 2023 18:37)    No issues overnight  ICU Vital Signs Last 24 Hrs  T(C): 36.7 (04 Feb 2023 20:00), Max: 37.1 (04 Feb 2023 14:52)  T(F): 98.1 (04 Feb 2023 20:00), Max: 98.7 (04 Feb 2023 14:52)  HR: 67 (04 Feb 2023 22:00) (56 - 83)  BP: 144/106 (04 Feb 2023 22:00) (120/70 - 154/89)  BP(mean): 116 (04 Feb 2023 22:00) (80 - 116)  ABP: 112/98 (04 Feb 2023 06:00) (112/89 - 127/85)  ABP(mean): 105 (04 Feb 2023 06:00) (97 - 106)  RR: 19 (04 Feb 2023 22:00) (11 - 23)  SpO2: 100% (04 Feb 2023 22:00) (95% - 100%)    O2 Parameters below as of 04 Feb 2023 20:00  Patient On (Oxygen Delivery Method): mask, simple face, for humidification purpose only    I/O: 1039/775    AAO X 3  PERRLA, EOMI  CN 2-12 grossly intact  RIVERA strength 5/5  No dysmetria or drift    MEDICATIONS  (STANDING):  acetaminophen     Tablet .. 975 milliGRAM(s) Oral every 6 hours  heparin   Injectable 5000 Unit(s) SubCutaneous every 8 hours  influenza   Vaccine 0.5 milliLiter(s) IntraMuscular once  polyethylene glycol 3350 17 Gram(s) Oral daily  senna 2 Tablet(s) Oral at bedtime    MEDICATIONS  (PRN):  oxyCODONE    IR 5 milliGRAM(s) Oral every 6 hours PRN Moderate Pain (4 - 6)  sodium chloride 0.65% Nasal 1 Spray(s) Both Nostrils every 8 hours PRN Nasal discomfort                          11.9   11.20 )-----------( 205      ( 05 Feb 2023 01:20 )             37.1     02-05    137  |  105  |  11  ----------------------------<  105<H>  3.8   |  23  |  0.77    Ca    8.8      05 Feb 2023 01:20  Phos  3.3     02-05  Mg     2.20     02-05

## 2023-02-05 NOTE — DISCHARGE NOTE PROVIDER - NSDCCPCAREPLAN_GEN_ALL_CORE_FT
PRINCIPAL DISCHARGE DIAGNOSIS  Diagnosis: S/P selective transsphenoidal pituitary adenomectomy  Assessment and Plan of Treatment:

## 2023-02-05 NOTE — PROGRESS NOTE ADULT - ASSESSMENT
Dx: 32y Female s/p TSP for resection of hemorrhagic pituitary adenoma w abdominal fat graft, path sent, lumbar drain placed at beginning of case w fluorescein, removed at end, closed w Monocryl stitch. Abdomen incision closed w staples. Patient doing well post op, no visual changes, having some headache. no fluorescein leaking from nose currently.     2/4: Stable POD # 1 S/P TSP for resection of hemorrhagic pituitary adenoma. No sign of CSF leak or DI  2/5: Stable POD # 2 S/P TSP for resection of hemorrhagic pituitary adenoma. No sign of CSF leak or DI. Na 137

## 2023-02-05 NOTE — PROGRESS NOTE ADULT - PROBLEM SELECTOR PLAN 1
- q6 hr serum sodiums  - cbc at 6pm  - sinus precautions: no nose blowing, no straws, no incentive spirometry   - mustache dressing -- if any highlighter yellow appears on the dressing notify nsg immediately   - ambulate as tolerated  - post op cth   - post op mri tomorrow  - strict I&O    [id number]    Case discussed with attending neurosurgeon Dr. Ramirez
Neurologic checks Q2H  Monitor for DI  Monitor for CSF leak  MRI today.
Neurologic checks Q1H  Monitor for DI  Monitor for CSF leak  MRI today

## 2023-02-05 NOTE — DISCHARGE NOTE PROVIDER - HOSPITAL COURSE
33 y/o female with hx hyperprolactinemia 2011 with amenorrhea. MRI was done which showed pituitary mass, started on Cabergoline with improvement  Cabergoline was stopped 2016, her periods remained normal.  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  Scheduled for Transphenoidal resection of suprasellar lesion (pituitary tumor, possible mucosal graft, possible nasoseptal flap.    Neurosurgical course:    Dx: 32y Female s/p TSP for resection of hemorrhagic pituitary adenoma w abdominal fat graft, path sent, lumbar drain placed at beginning of case w fluorescein, removed at end, closed w Monocryl stitch. Abdomen incision closed w staples. Patient doing well post op, no visual changes, having some headache. no fluorescein leaking from nose currently.     2/4: Stable POD # 1 S/P TSP for resection of hemorrhagic pituitary adenoma. No sign of CSF leak or DI  2/5: Stable POD # 2 S/P TSP for resection of hemorrhagic pituitary adenoma. No sign of CSF leak or DI. Na 137    Olsen was removed, patient is voiding freely, reports her vision has improved.  She;s ambulating independently, tolerating diet and is stable for discharge with outpatient MRI brain.

## 2023-02-05 NOTE — DISCHARGE NOTE PROVIDER - NSDCFUADDAPPT_GEN_ALL_CORE_FT
call tomorrow to schedule f/u appt. with DR. Lawler, ENT  call tomorrow to schedule f/u appt. with endocrinologist  MRI of the brain will be scheduled as outpatient by DR. Roberts office

## 2023-02-05 NOTE — DISCHARGE NOTE NURSING/CASE MANAGEMENT/SOCIAL WORK - NSDCPEFALRISK_GEN_ALL_CORE
For information on Fall & Injury Prevention, visit: [email or web address] OR  [email or web address] OR  https://www.cdc.gov/steadi/patient.html

## 2023-02-05 NOTE — DISCHARGE NOTE PROVIDER - CARE PROVIDER_API CALL
Roni Ramirez (MD)  Neurosurgery; Pediatric Neurosurgery  [address] [place] [address], [address]  [place], [place] [id number]  Phone: [phone]  Fax: [phone]  Follow Up Time:

## 2023-02-05 NOTE — PROGRESS NOTE ADULT - ASSESSMENT
PLAN  NEUROLOGIC   - AAOx 3  - Pain control with Tylenol and Dilaudid   - CTH done post op, f/u read  - Monitor mustache dressing for leak  - Q2NV checks    RESPIRATORY   - Currently on RA   - Monitor SpO2 goal >92%  - Sinus precautions, no blowing or instrumentation of nose    CARDIOVASCULAR   - Monitor hemodynamics   - No MAP goals per primary team    GASTROINTESTINAL   - Diet: regular diet   - Bowl regimen to prevent straining    /RENAL   - IV fluids: LR @ 30 cc/hr   - Monitor BMP  - Strict I/Os  - Monitor electrolytes, replete PRN  - Maintain fernandez catheter  - DI watch f/u BMP q6     HEMATOLOGIC  - Monitor H/H   - DVT prophylaxis: SCDs for now  - Hold DVT ppx until 2/4 per primary team     INFECTIOUS DISEASE  - Monitor fever/WC  - Ancef x 24 hr    ENDOCRINE  - Monitor gluc  - ISS    LINES  - rad a line  - PIV  - Fernandez    Disposition:   SICU 32y female with pmhxhx hyperprolactinemia 2011 with amenorrhea. MRI revealed pituitary mass, s/p transphenoidal resection of hemorrhagic pituitary adenoma on 2/3/23.      PLAN  NEUROLOGIC   - AAOx 3  - Pain control with Tylenol and Dilaudid   - 2/3 post-op CTH: No acute intracranial hemorrhage or acute territorial infarct. Postoperative changes   - Monitor mustache dressing for leak  - Currently Q2 NV checks -> advance per primary team     RESPIRATORY   - Currently on RA   - Monitor SpO2 goal >92%  - Sinus precautions, no blowing or instrumentation of nose    CARDIOVASCULAR   - Monitor hemodynamics   - No MAP goals per primary team    GASTROINTESTINAL   - Diet: regular diet   - Bowel regimen (Miralax, senna) to prevent straining    /RENAL   - DI watch, f/u BMP q6   - Strict I/Os  - Monitor electrolytes, replete PRN  - D/c fernandez catheter, f/u TOV     HEMATOLOGIC  - Monitor H/H   - DVT prophylaxis: SCDs, HSQ     INFECTIOUS DISEASE  - Monitor fever/WC  - s/p Ancef x 24 hr    ENDOCRINE  - Monitor glucose   - ISS    LINES  - PIV  - D/c Fernandez    Disposition: eligible for transfer to the floors  32y female with pmhxhx hyperprolactinemia 2011 with amenorrhea. MRI revealed pituitary mass, s/p transphenoidal resection of hemorrhagic pituitary adenoma on 2/3/23.      PLAN  NEUROLOGIC   - AAOx 3  - Pain control with Tylenol and Dilaudid   - 2/3 post-op CTH: No acute intracranial hemorrhage or acute territorial infarct. Postoperative changes   - Monitor mustache dressing for leak  - Currently Q2 NV checks -> advance per primary team   - Pending MRI brain    RESPIRATORY   - Currently on RA   - Monitor SpO2 goal >92%  - Sinus precautions, no blowing or instrumentation of nose    CARDIOVASCULAR   - Monitor hemodynamics   - No MAP goals per primary team    GASTROINTESTINAL   - Diet: regular diet   - Bowel regimen (Miralax, senna) to prevent straining    /RENAL   - DI watch, f/u BMP q6   - Strict I/Os  - Monitor electrolytes, replete PRN  - D/c fernandez catheter, f/u TOV     HEMATOLOGIC  - Monitor H/H   - DVT prophylaxis: SCDs, HSQ     INFECTIOUS DISEASE  - Monitor fever/WC  - s/p Ancef x 24 hr    ENDOCRINE  - Monitor glucose   - ISS    LINES  - PIV  - D/c Fernandez    Disposition: eligible for transfer to the floors

## 2023-02-05 NOTE — PROGRESS NOTE ADULT - SUBJECTIVE AND OBJECTIVE BOX
SUBJECTIVE:  Pt seen & examined at bedside  No acute events overnight  Pain controlled  No complaints  On bedrest  Tolerating diet  No F/C, N/V, CP/SOB  No nasal drainage      Vital Signs Last 24 Hrs  T(C): 36.8 (05 Feb 2023 08:00), Max: 37.1 (04 Feb 2023 14:52)  T(F): 98.3 (05 Feb 2023 08:00), Max: 98.7 (04 Feb 2023 14:52)  HR: 62 (05 Feb 2023 08:00) (54 - 73)  BP: 121/93 (05 Feb 2023 08:00) (114/100 - 147/90)  BP(mean): 102 (05 Feb 2023 08:00) (80 - 116)  RR: 18 (05 Feb 2023 08:00) (11 - 23)  SpO2: 100% (05 Feb 2023 08:00) (97% - 100%)    Parameters below as of 05 Feb 2023 08:00  Patient On (Oxygen Delivery Method): face tent,for humidity and comfort  General: NAD, A+Ox3  Respiratory: No respiratory distress, stridor, or stertor  Face:  Symmetric without masses or lesions  OU: EOMI  Nose: no drainage anteriorly  OC/OP: tongue normal, floor of mouth WNL, no masses or lesions, OP without bleeding or drainage  Neck: soft/flat      A/P: 31yo F pituitary hemorrhagic adenoma sp intrathecal flourescein,  TSPR 2/3, c/b intraop CSF leak, repaired with fat graft (no nasoseptal flap)  -f/u MRI  -f/u UOP, sodium  -montitor for CSF leak  -rest of care per sicu/nsgy

## 2023-02-09 ENCOUNTER — APPOINTMENT (OUTPATIENT)
Dept: MRI IMAGING | Facility: IMAGING CENTER | Age: 33
End: 2023-02-09

## 2023-02-10 LAB — SURGICAL PATHOLOGY STUDY: SIGNIFICANT CHANGE UP

## 2023-02-12 ENCOUNTER — TRANSCRIPTION ENCOUNTER (OUTPATIENT)
Age: 33
End: 2023-02-12

## 2023-02-28 ENCOUNTER — APPOINTMENT (OUTPATIENT)
Dept: MRI IMAGING | Facility: IMAGING CENTER | Age: 33
End: 2023-02-28

## 2023-02-28 ENCOUNTER — APPOINTMENT (OUTPATIENT)
Dept: ENDOCRINOLOGY | Facility: CLINIC | Age: 33
End: 2023-02-28

## 2023-03-07 ENCOUNTER — OUTPATIENT (OUTPATIENT)
Dept: OUTPATIENT SERVICES | Facility: HOSPITAL | Age: 33
LOS: 1 days | End: 2023-03-07
Payer: MEDICARE

## 2023-03-07 ENCOUNTER — APPOINTMENT (OUTPATIENT)
Dept: MRI IMAGING | Facility: HOSPITAL | Age: 33
End: 2023-03-07

## 2023-03-07 ENCOUNTER — APPOINTMENT (OUTPATIENT)
Dept: ENDOCRINOLOGY | Facility: CLINIC | Age: 33
End: 2023-03-07

## 2023-03-07 PROCEDURE — 70553 MRI BRAIN STEM W/O & W/DYE: CPT | Mod: 26,MD

## 2023-03-07 PROCEDURE — A9585: CPT

## 2023-03-07 PROCEDURE — 70553 MRI BRAIN STEM W/O & W/DYE: CPT | Mod: MD

## 2023-05-08 ENCOUNTER — NON-APPOINTMENT (OUTPATIENT)
Age: 33
End: 2023-05-08

## 2023-05-08 ENCOUNTER — APPOINTMENT (OUTPATIENT)
Dept: OPHTHALMOLOGY | Facility: CLINIC | Age: 33
End: 2023-05-08
Payer: MEDICARE

## 2023-05-08 PROCEDURE — 92083 EXTENDED VISUAL FIELD XM: CPT

## 2023-05-08 PROCEDURE — 92250 FUNDUS PHOTOGRAPHY W/I&R: CPT

## 2023-05-08 PROCEDURE — 92012 INTRM OPH EXAM EST PATIENT: CPT

## 2023-05-08 PROCEDURE — 76514 ECHO EXAM OF EYE THICKNESS: CPT

## 2023-07-03 ENCOUNTER — APPOINTMENT (OUTPATIENT)
Dept: OPHTHALMOLOGY | Facility: CLINIC | Age: 33
End: 2023-07-03

## 2023-07-18 NOTE — PATIENT PROFILE ADULT - FUNCTIONAL ASSESSMENT - BASIC MOBILITY ASSESSMENT TYPE
Assessment/Plan:    Obesity (BMI 30-39.9)  - Discussed options of [facility]-Intensive Lifestyle Intervention Program, Very Low Calorie Diet-VLCD, Conservative Program, Mario-En-Y Gastric Bypass and Vertical Sleeve Gastrectomy and the role of weight loss medications. - Insurance does not cover weight loss surgery. - Patient is interested in pursuing Conservative Program with menu planning with dietician. She is interested in partial meal replacement. Samples of [employer] products given. - Initial weight loss goal of 5-10% weight loss for improved health  - Weight loss can improve patient's co-morbid conditions and/or prevent weight-related complications. - S/P hysterectomy  - On phentermine in the past and did well. - Already on Topamax for migraine prevention, but no difference with appetite. - Taking Trulicity through primary care. No difference with appetite. Will review with primary care to see if changing to Ozempic possible, to help with weight loss. - Patient denies personal history of pancreatitis. Patient also denies personal and family history of thyroid cancer and multiple endocrine neoplasia type 2 (MEN 2 tumor). - Stop Donnie Chandra 2/8  - Labs reviewed: TSH and A1C 3/24/2023 were both within acceptable range. Lipid panel, CMP, and CBC 9/28/2022. Trig elevated and will likely improve with weight loss. Remainder of the blood work was within acceptable range. Goals:  Do not skip meals. Food log (ie.) www.JagTag.com,Garlik,9+,INAPPIN. com,etc. baritastic (use [employer], dietdoctor. com or smartphone manpreet [employer] for recipes)  No sugary beverages. At least 64oz of water daily. Increase physical activity by 10 minutes daily.  Gradually increase physical activity to a goal of 5 days per week for 30 minutes of MODERATE intensity PLUS 2 days per week of FULL BODY resistance training (use smartphone apps [employer], Home Workout, etc.)  Start food logging, weighing and measuring food. 1300 calories per day. Sample menu given. Further nutrition recommendations per the dietician. Keep up the great work with water intake, at least 64 oz daily. Start exercise, such as walking 2 days per week for 15 minutes and gradually increase to goal of 5 days per week for 30 minutes and 2 days resistance training. Increase sleep time closer to 7-8 hours. Get 3 meals per day with healthy snacks in between to avoid getting excessively hungry. Diabetes mellitus, type 2 ([address])  - Taking Trulicity. Will check with primary about possibly changing to Ozempic. May improve with weight loss and lifestyle modification. Lab Results   Component Value Date    HGBA1C 5.6 03/24/2023       Hypothyroidism  - Taking levothyroxine. Continue management with prescribing provider. Gastroesophageal reflux disease without esophagitis  - Taking pepcid and protonix. May improve with weight loss and lifestyle modification. Continue management with prescribing provider. Intractable migraine without status migrainosus  - Taking Topamax. Continue management with prescribing provider. Hyperlipidemia  - Taking atorvastatin. May improve with weight loss and lifestyle modification. Continue management with prescribing provider. Geeta Peraza was seen today for consult. Diagnoses and all orders for this visit:    Obesity (BMI 30-39.9)  -     Ambulatory Referral to Weight Management    Type 2 diabetes mellitus with other specified complication, without long-term current use of insulin (HCC)    Hypothyroidism, unspecified type    Gastroesophageal reflux disease without esophagitis    Intractable migraine without status migrainosus, unspecified migraine type    Hyperlipidemia, unspecified hyperlipidemia type        Total time spent: 45 min, with >50% face-to-face time spent counseling patient on nonsurgical interventions for the treatment of excess weight.  Discussed in detail nonsurgical options including intensive lifestyle intervention program, very low-calorie diet program and conservative program.  Discussed the role of weight loss medications. Counseled patient on diet behavior and exercise modification for weight loss. Follow up in approximately 2 month nurse visit and 4 months with Non-Surgical Physician/Advanced Practitioner. Subjective:   Chief Complaint   Patient presents with   • Consult     MWM Consult; Gw-180lb; waist-43in ; Stop bang-2/8       Patient ID: Carley Phillips  is a 56 y.o. female with excess weight/obesity here to pursue weight management. Presents to the office visit with her . [employer]  #[id number] translated the office visit. Previous notes and records have been reviewed.     Past Medical History:   Diagnosis Date   • Ankle swelling    • Calf pain     with walking   • Constipation    • Diabetes mellitus (HCC)    • Disease of thyroid gland    • Double vision    • GERD (gastroesophageal reflux disease)    • H/O: hysterectomy    • Headache    • History of hysterectomy 9/9/2021   • History of hysterectomy 9/9/2021   • Hypertension    • Joint pain    • Numbness    • Palpitations    • Polycystic ovary syndrome    • SOB (shortness of breath)      Past Surgical History:   Procedure Laterality Date   • HYSTERECTOMY         HPI:  Wt Readings from Last 20 Encounters:   07/18/23 98.7 kg (217 lb 9.6 oz)   04/10/23 99.3 kg (219 lb)   12/07/22 94.3 kg (208 lb)   09/27/22 97.1 kg (214 lb)   07/08/22 93.4 kg (206 lb)   06/29/22 93.9 kg (207 lb 0.3 oz)   05/31/22 93 kg (205 lb)   05/09/22 92.1 kg (203 lb)   03/28/22 91.6 kg (202 lb)   01/24/22 90.3 kg (199 lb)   12/20/21 90.3 kg (199 lb)   09/09/21 96.1 kg (211 lb 14.4 oz)   09/02/21 97.5 kg (214 lb 15.2 oz)   05/19/21 97.7 kg (215 lb 4.8 oz)   05/17/21 98 kg (216 lb)   03/22/21 95.7 kg (211 lb)   03/15/21 95.1 kg (209 lb 11.2 oz)   03/01/21 96.2 kg (212 lb)   02/08/21 96.6 kg (213 lb)   02/08/21 96.6 kg (213 lb)     Obesity/Excess Weight:  Severity: Moderate  Onset:  10 years    Modifiers: Diet and Exercise, Prescription Weight Loss Medications and Herbalife  Contributing factors: Poor Food Choices, Stress/Emotional Eating and working nighshift   Associated symptoms: comorbid conditions and increased joint pain     On Phentermine in the past, about 2 years ago. Did well on it and lost weight. Struggling with appetite and cravings. Taking Trulicity, no difference with weight. Has not been on any other injectable medications. Taking Topamax for migraine prevention, no difference with appetite. 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 Not breastfeeding. S/P hysterectomy. Hydration: 5-6 bottles water, 2-4 cups coffee with sugar    Alcohol: none  Smoking: denies  Exercise: none  Occupation: night shift   Sleep: 4 hours  STOP ban/8    Highest weight: current   Current weight: 217.6 lbs BMI 38.55  Goal weight: 180 lbs    Colonoscopy: seeing GI  Mammogram: due, has order, encouraged to schedule    The following portions of the patient's history were reviewed and updated as appropriate: allergies, current medications, past family history, past medical history, past social history, past surgical history, and problem list.    Family History   Problem Relation Age of Onset   • Hypertension Mother    • Heart disease Mother    • Diabetes Mother    • Thyroid disease Mother    • Thyroid disease Father    • Diabetes Father    • Hypertension Father    • Hypertension Sister    • Heart attack Sister    • Diabetes Sister    • Heart disease Sister    • Hypertension Brother    • Heart disease Brother    • Heart attack Brother    • Diabetes Brother    • Cancer Neg Hx         Review of Systems   HENT: Negative for sore throat. Respiratory: Negative for cough and shortness of breath. Cardiovascular: Positive for palpitations (occ, advised to follow-up with PCP). Negative for chest pain. Gastrointestinal: Positive for constipation (seeing GI). Negative for diarrhea, nausea and vomiting. Denies GERD   Endocrine: Positive for heat intolerance. Negative for cold intolerance. Genitourinary: Negative for dysuria. Musculoskeletal: Positive for arthralgias (intermittent) and back pain. Skin: Negative for rash. Neurological: Positive for headaches (has migraines ). Psychiatric/Behavioral: Negative for suicidal ideas (or HI). Denies depression and anxiety       Objective:  /71   Pulse 94   Resp 16   Ht 5' 3" (1.6 m)   Wt 98.7 kg (217 lb 9.6 oz)   BMI 38.55 kg/m²     Physical Exam  Vitals and nursing note reviewed. Constitutional   General appearance: Abnormal.  well developed and obese. Eyes No conjunctival injection. Ears, Nose, Mouth, and Throat Oral mucosa moist.   Pulmonary   Respiratory effort: No increased work of breathing or signs of respiratory distress. Cardiovascular     Examination of extremities for edema and/or varicosities: Normal.  no edema. Abdomen   Abdomen: Abnormal.  The abdomen was obese.     Musculoskeletal   Normal range of motion  Neurological   Gait and station: Normal.    Psychiatric   Orientation to person, place and time: Normal.    Affect: appropriate Admission

## 2023-11-13 ENCOUNTER — APPOINTMENT (OUTPATIENT)
Dept: OPHTHALMOLOGY | Facility: CLINIC | Age: 33
End: 2023-11-13

## 2023-11-18 ENCOUNTER — APPOINTMENT (OUTPATIENT)
Dept: MRI IMAGING | Facility: HOSPITAL | Age: 33
End: 2023-11-18

## 2023-12-04 ENCOUNTER — APPOINTMENT (OUTPATIENT)
Dept: OPHTHALMOLOGY | Facility: CLINIC | Age: 33
End: 2023-12-04

## 2024-12-08 NOTE — ASU PREOP CHECKLIST - BP NONINVASIVE DIASTOLIC (MM HG)
Pt arrives to ED with complaints of needing sutures removed on left breast.. pt was seen here and had stiches places on Tuesday and was told to come back here for removal if she didn't have a PCP  
88

## 2025-05-29 ENCOUNTER — APPOINTMENT (OUTPATIENT)
Dept: OTOLARYNGOLOGY | Facility: CLINIC | Age: 35
End: 2025-05-29

## 2025-05-29 DIAGNOSIS — D35.2 BENIGN NEOPLASM OF PITUITARY GLAND: ICD-10-CM

## (undated) DEVICE — FOLEY CATH 2-WAY 16FR 5CC LATEX LUBRICATH

## (undated) DEVICE — FOLEY TRAY 14FR 5CC LF UMETER CLOSED

## (undated) DEVICE — MEDTRONIC AXIEM PATIENT TRACKER NON-INVASIVE

## (undated) DEVICE — SUT PLAIN GUT 4-0 18" SC-1

## (undated) DEVICE — PREP DURAPREP 26CC

## (undated) DEVICE — BLADE MEDTRONIC ENT FUSION TRICUT ROTATABLE STRAIGHT 4MM X 13CM

## (undated) DEVICE — LABELS BLANK W PEN

## (undated) DEVICE — MIDAS REX LEGEND TELESCOPING MATCH HEAD DIAMOND 2.5MM X 12CM

## (undated) DEVICE — SOL IRR POUR NS 0.9% 500ML

## (undated) DEVICE — SOL IRR POUR H2O 500ML

## (undated) DEVICE — Device

## (undated) DEVICE — PACK SMR

## (undated) DEVICE — VENODYNE/SCD SLEEVE CALF MEDIUM

## (undated) DEVICE — SUCTION COAGULATOR HAND CONTROL 10FR X 6"

## (undated) DEVICE — ELCTR BOVIE TIP BLADE INSULATED 6.5" EDGE

## (undated) DEVICE — DRSG NASOPORE 8CM FIRM

## (undated) DEVICE — STORZ DURA MICRO KNIFE INSERT SICKLE-SHAPED

## (undated) DEVICE — SOL ANTI FOG

## (undated) DEVICE — STAPLER SKIN VISI-STAT 35 WIDE

## (undated) DEVICE — SUT VICRYL 3-0 18" SH UNDYED (POP-OFF)

## (undated) DEVICE — DRSG CURITY GAUZE SPONGE 4 X 4" 12-PLY

## (undated) DEVICE — MIDAS REX LEGEND TELESCOPING MATCH HEAD FLUTED 2.5MM X 12CM

## (undated) DEVICE — SOL IRR POUR H2O 1500ML

## (undated) DEVICE — NDL SPINAL 18G X 3.5" (PINK)

## (undated) DEVICE — DRSG TELFA 3 X 8

## (undated) DEVICE — DRAPE INSTRUMENT POUCH 6.75" X 11"

## (undated) DEVICE — SNAP ON SPHERZ 3 PACK

## (undated) DEVICE — SUT VICRYL 2-0 18" CT-1 UNDYED (POP-OFF)

## (undated) DEVICE — PACKING 1/2"

## (undated) DEVICE — DRSG TEGADERM 4X4.75"

## (undated) DEVICE — PROTECTOR HEEL / ELBOW FLUFFY

## (undated) DEVICE — PACK NEURO MINOR

## (undated) DEVICE — POSITIONER STRAP ARMBOARD VELCRO TS-30

## (undated) DEVICE — CLEANING SHEATH ENDO-SCRUB FOR STORZ 7210AA TELESCOPE 4MM 0 DEGREE

## (undated) DEVICE — DRAPE THYROID 77" X 123"

## (undated) DEVICE — LIJ-MEDTRONIC STEALTH NAVIGATION: Type: DURABLE MEDICAL EQUIPMENT

## (undated) DEVICE — SUTURE REMOVAL KIT

## (undated) DEVICE — ENDO SCRUB

## (undated) DEVICE — WARMING BLANKET FULL ADULT

## (undated) DEVICE — DRAPE TOWEL BLUE STICKY

## (undated) DEVICE — MIDAS REX LEGEND LUBRICANT DIFFUSER CARTRIDGE

## (undated) DEVICE — SUT CHROMIC 4-0 18" G-2

## (undated) DEVICE — MIDAS REX LEGEND TELESCOPING MATCH HEAD DIAMOND 3.5MM X 12CM

## (undated) DEVICE — DRAPE CRAN

## (undated) DEVICE — SUT MONOCRYL 3-0 27" PS-2 UNDYED

## (undated) DEVICE — GLV 7.5 PROTEXIS (CREAM) MICRO

## (undated) DEVICE — STAPLER SKIN MULTI DIRECTION W35

## (undated) DEVICE — FOLEY TRAY 16FR 5CC LF UMETER CLOSED

## (undated) DEVICE — DRAIN LIMITORR DRAIN SYSTEM 30ML

## (undated) DEVICE — TUBING IRRIGATION STRAIGHT SHOT

## (undated) DEVICE — CLEANING SHEATH ENDO-SCRUB FOR STORZ 7230BVA SINUSCOPE 4MM 30 DEGREE

## (undated) DEVICE — MEDTRONIC INSTRUMENT TRACKER ENT

## (undated) DEVICE — BLADE MEDTRONIC ENT TRICUT ROTATABLE STRAIGHT 4MM X 11CM

## (undated) DEVICE — SUT MONOCRYL 4-0 27" PS-2 UNDYED

## (undated) DEVICE — DRAPE 3/4 SHEET 52X76"

## (undated) DEVICE — SOL INJ NS 0.9% 500ML 1-PORT

## (undated) DEVICE — DRAPE LARGE SHEET 72X85"

## (undated) DEVICE — CANISTER DISPOSABLE THIN WALL 3000CC

## (undated) DEVICE — SNAP ON SPHERZ 5 PACK